# Patient Record
Sex: MALE | Race: WHITE | Employment: FULL TIME | URBAN - METROPOLITAN AREA
[De-identification: names, ages, dates, MRNs, and addresses within clinical notes are randomized per-mention and may not be internally consistent; named-entity substitution may affect disease eponyms.]

---

## 2017-01-17 ENCOUNTER — OFFICE VISIT (OUTPATIENT)
Dept: FAMILY MEDICINE CLINIC | Age: 42
End: 2017-01-17

## 2017-01-17 VITALS
HEART RATE: 100 BPM | TEMPERATURE: 98.2 F | SYSTOLIC BLOOD PRESSURE: 132 MMHG | RESPIRATION RATE: 20 BRPM | WEIGHT: 181.4 LBS | HEIGHT: 70 IN | BODY MASS INDEX: 25.97 KG/M2 | DIASTOLIC BLOOD PRESSURE: 82 MMHG

## 2017-01-17 DIAGNOSIS — S39.012A LUMBAR STRAIN, INITIAL ENCOUNTER: ICD-10-CM

## 2017-01-17 DIAGNOSIS — M62.838 MUSCLE SPASM: Primary | ICD-10-CM

## 2017-01-17 PROCEDURE — G8484 FLU IMMUNIZE NO ADMIN: HCPCS | Performed by: FAMILY MEDICINE

## 2017-01-17 PROCEDURE — 1036F TOBACCO NON-USER: CPT | Performed by: FAMILY MEDICINE

## 2017-01-17 PROCEDURE — G8419 CALC BMI OUT NRM PARAM NOF/U: HCPCS | Performed by: FAMILY MEDICINE

## 2017-01-17 PROCEDURE — G8427 DOCREV CUR MEDS BY ELIG CLIN: HCPCS | Performed by: FAMILY MEDICINE

## 2017-01-17 PROCEDURE — 99213 OFFICE O/P EST LOW 20 MIN: CPT | Performed by: FAMILY MEDICINE

## 2017-01-17 RX ORDER — CYCLOBENZAPRINE HCL 10 MG
10 TABLET ORAL 2 TIMES DAILY PRN
Qty: 30 TABLET | Refills: 0 | Status: SHIPPED | OUTPATIENT
Start: 2017-01-17 | End: 2017-01-27

## 2017-01-17 RX ORDER — METHYLPREDNISOLONE 4 MG/1
TABLET ORAL
Qty: 1 KIT | Refills: 0 | Status: SHIPPED | OUTPATIENT
Start: 2017-01-17 | End: 2017-02-24 | Stop reason: ALTCHOICE

## 2017-01-17 ASSESSMENT — ENCOUNTER SYMPTOMS
NAUSEA: 0
ABDOMINAL PAIN: 0
SHORTNESS OF BREATH: 0
EYES NEGATIVE: 1
COUGH: 0
CONSTIPATION: 0
DIARRHEA: 0

## 2017-01-17 ASSESSMENT — PATIENT HEALTH QUESTIONNAIRE - PHQ9
SUM OF ALL RESPONSES TO PHQ9 QUESTIONS 1 & 2: 0
SUM OF ALL RESPONSES TO PHQ QUESTIONS 1-9: 0
2. FEELING DOWN, DEPRESSED OR HOPELESS: 0
1. LITTLE INTEREST OR PLEASURE IN DOING THINGS: 0

## 2017-02-24 ENCOUNTER — OFFICE VISIT (OUTPATIENT)
Dept: FAMILY MEDICINE CLINIC | Age: 42
End: 2017-02-24

## 2017-02-24 VITALS
TEMPERATURE: 97.8 F | WEIGHT: 183 LBS | HEIGHT: 70 IN | OXYGEN SATURATION: 98 % | SYSTOLIC BLOOD PRESSURE: 130 MMHG | RESPIRATION RATE: 12 BRPM | HEART RATE: 104 BPM | DIASTOLIC BLOOD PRESSURE: 86 MMHG | BODY MASS INDEX: 26.2 KG/M2

## 2017-02-24 DIAGNOSIS — H00.13 CHALAZION OF RIGHT EYE: Primary | ICD-10-CM

## 2017-02-24 PROCEDURE — G8484 FLU IMMUNIZE NO ADMIN: HCPCS | Performed by: FAMILY MEDICINE

## 2017-02-24 PROCEDURE — G8427 DOCREV CUR MEDS BY ELIG CLIN: HCPCS | Performed by: FAMILY MEDICINE

## 2017-02-24 PROCEDURE — 1036F TOBACCO NON-USER: CPT | Performed by: FAMILY MEDICINE

## 2017-02-24 PROCEDURE — G8419 CALC BMI OUT NRM PARAM NOF/U: HCPCS | Performed by: FAMILY MEDICINE

## 2017-02-24 PROCEDURE — 99213 OFFICE O/P EST LOW 20 MIN: CPT | Performed by: FAMILY MEDICINE

## 2017-02-24 RX ORDER — CEPHALEXIN 500 MG/1
500 CAPSULE ORAL 3 TIMES DAILY
Qty: 30 CAPSULE | Refills: 0 | Status: SHIPPED | OUTPATIENT
Start: 2017-02-24 | End: 2017-03-06

## 2017-02-24 ASSESSMENT — ENCOUNTER SYMPTOMS
CONSTIPATION: 0
EYES NEGATIVE: 1
COUGH: 0
DIARRHEA: 0
ABDOMINAL PAIN: 0
NAUSEA: 0
SHORTNESS OF BREATH: 0

## 2017-04-04 ENCOUNTER — OFFICE VISIT (OUTPATIENT)
Dept: FAMILY MEDICINE CLINIC | Age: 42
End: 2017-04-04

## 2017-04-04 VITALS
DIASTOLIC BLOOD PRESSURE: 86 MMHG | HEIGHT: 70 IN | BODY MASS INDEX: 26.86 KG/M2 | WEIGHT: 187.6 LBS | TEMPERATURE: 97.6 F | RESPIRATION RATE: 12 BRPM | HEART RATE: 88 BPM | SYSTOLIC BLOOD PRESSURE: 144 MMHG

## 2017-04-04 DIAGNOSIS — R19.4 CHANGE IN BOWEL HABIT: ICD-10-CM

## 2017-04-04 DIAGNOSIS — R10.32 ABDOMINAL PAIN, LEFT LOWER QUADRANT: Primary | ICD-10-CM

## 2017-04-04 PROCEDURE — 99213 OFFICE O/P EST LOW 20 MIN: CPT | Performed by: FAMILY MEDICINE

## 2017-04-04 PROCEDURE — G8427 DOCREV CUR MEDS BY ELIG CLIN: HCPCS | Performed by: FAMILY MEDICINE

## 2017-04-04 PROCEDURE — G8419 CALC BMI OUT NRM PARAM NOF/U: HCPCS | Performed by: FAMILY MEDICINE

## 2017-04-04 PROCEDURE — 1036F TOBACCO NON-USER: CPT | Performed by: FAMILY MEDICINE

## 2017-04-04 ASSESSMENT — ENCOUNTER SYMPTOMS
ABDOMINAL PAIN: 1
DIARRHEA: 0
CONSTIPATION: 1
NAUSEA: 0
COUGH: 0
EYES NEGATIVE: 1
SHORTNESS OF BREATH: 0

## 2017-04-14 DIAGNOSIS — R19.4 CHANGE IN BOWEL HABIT: ICD-10-CM

## 2017-04-14 DIAGNOSIS — R10.32 ABDOMINAL PAIN, LEFT LOWER QUADRANT: ICD-10-CM

## 2017-04-24 ENCOUNTER — OFFICE VISIT (OUTPATIENT)
Dept: FAMILY MEDICINE CLINIC | Age: 42
End: 2017-04-24

## 2017-04-24 VITALS
SYSTOLIC BLOOD PRESSURE: 126 MMHG | HEART RATE: 84 BPM | DIASTOLIC BLOOD PRESSURE: 84 MMHG | WEIGHT: 189 LBS | HEIGHT: 70 IN | BODY MASS INDEX: 27.06 KG/M2 | TEMPERATURE: 97.7 F | RESPIRATION RATE: 16 BRPM

## 2017-04-24 DIAGNOSIS — R10.32 ABDOMINAL PAIN, LEFT LOWER QUADRANT: Primary | ICD-10-CM

## 2017-04-24 PROCEDURE — G8427 DOCREV CUR MEDS BY ELIG CLIN: HCPCS | Performed by: FAMILY MEDICINE

## 2017-04-24 PROCEDURE — G8419 CALC BMI OUT NRM PARAM NOF/U: HCPCS | Performed by: FAMILY MEDICINE

## 2017-04-24 PROCEDURE — 1036F TOBACCO NON-USER: CPT | Performed by: FAMILY MEDICINE

## 2017-04-24 PROCEDURE — 99213 OFFICE O/P EST LOW 20 MIN: CPT | Performed by: FAMILY MEDICINE

## 2017-04-24 ASSESSMENT — ENCOUNTER SYMPTOMS
DIARRHEA: 0
EYES NEGATIVE: 1
COUGH: 0
NAUSEA: 0
SHORTNESS OF BREATH: 0
ABDOMINAL PAIN: 1
CONSTIPATION: 0

## 2017-05-26 ENCOUNTER — TELEPHONE (OUTPATIENT)
Dept: FAMILY MEDICINE CLINIC | Age: 42
End: 2017-05-26

## 2017-05-26 DIAGNOSIS — R76.8 HEPATITIS C ANTIBODY POSITIVE IN BLOOD: Primary | ICD-10-CM

## 2017-05-30 RX ORDER — CIPROFLOXACIN 500 MG/1
500 TABLET, FILM COATED ORAL 2 TIMES DAILY
Qty: 14 TABLET | Refills: 0 | Status: SHIPPED | OUTPATIENT
Start: 2017-05-30 | End: 2017-06-22 | Stop reason: SDUPTHER

## 2017-06-05 DIAGNOSIS — R76.8 HEPATITIS C ANTIBODY TEST POSITIVE: Primary | ICD-10-CM

## 2017-06-06 DIAGNOSIS — R76.8 HEPATITIS C ANTIBODY TEST POSITIVE: ICD-10-CM

## 2017-06-08 LAB
EER HEPATITIS C RNA PCR QUANT W/ GENOTYPE RFLX: NORMAL
HCV RNA QNT REAL-TIME PCR INTERP: NOT DETECTED
HCV RNA, QUANTITATIVE REAL TIME PCR: <1.2 LOG IU
HEPATITIS C RNA PCR QUANT: <15 IU/ML

## 2017-06-22 ENCOUNTER — OFFICE VISIT (OUTPATIENT)
Dept: FAMILY MEDICINE CLINIC | Age: 42
End: 2017-06-22

## 2017-06-22 VITALS
HEIGHT: 70 IN | DIASTOLIC BLOOD PRESSURE: 84 MMHG | TEMPERATURE: 97.9 F | RESPIRATION RATE: 16 BRPM | HEART RATE: 80 BPM | WEIGHT: 192.2 LBS | BODY MASS INDEX: 27.52 KG/M2 | SYSTOLIC BLOOD PRESSURE: 130 MMHG

## 2017-06-22 DIAGNOSIS — R30.0 DYSURIA: Primary | ICD-10-CM

## 2017-06-22 DIAGNOSIS — N41.0 ACUTE PROSTATITIS: ICD-10-CM

## 2017-06-22 DIAGNOSIS — R35.0 URINARY FREQUENCY: ICD-10-CM

## 2017-06-22 DIAGNOSIS — R30.0 DYSURIA: ICD-10-CM

## 2017-06-22 DIAGNOSIS — Z12.5 SPECIAL SCREENING FOR MALIGNANT NEOPLASM OF PROSTATE: ICD-10-CM

## 2017-06-22 LAB
BILIRUBIN, POC: NORMAL
BLOOD URINE, POC: NORMAL
CLARITY, POC: CLEAR
COLOR, POC: NORMAL
GLUCOSE URINE, POC: NORMAL
KETONES, POC: NORMAL
LEUKOCYTE EST, POC: NORMAL
NITRITE, POC: NORMAL
PH, POC: 6
PROSTATE SPECIFIC ANTIGEN: 0.95 NG/ML
PROTEIN, POC: NORMAL
SPECIFIC GRAVITY, POC: 1.01
UROBILINOGEN, POC: NORMAL

## 2017-06-22 PROCEDURE — 99213 OFFICE O/P EST LOW 20 MIN: CPT | Performed by: FAMILY MEDICINE

## 2017-06-22 PROCEDURE — G8419 CALC BMI OUT NRM PARAM NOF/U: HCPCS | Performed by: FAMILY MEDICINE

## 2017-06-22 PROCEDURE — 81003 URINALYSIS AUTO W/O SCOPE: CPT | Performed by: FAMILY MEDICINE

## 2017-06-22 PROCEDURE — 1036F TOBACCO NON-USER: CPT | Performed by: FAMILY MEDICINE

## 2017-06-22 PROCEDURE — G8427 DOCREV CUR MEDS BY ELIG CLIN: HCPCS | Performed by: FAMILY MEDICINE

## 2017-06-22 RX ORDER — CIPROFLOXACIN 500 MG/1
500 TABLET, FILM COATED ORAL 2 TIMES DAILY
Qty: 42 TABLET | Refills: 0 | Status: SHIPPED | OUTPATIENT
Start: 2017-06-22 | End: 2017-07-13

## 2017-06-24 LAB — URINE CULTURE, ROUTINE: NORMAL

## 2017-06-25 ASSESSMENT — ENCOUNTER SYMPTOMS
SHORTNESS OF BREATH: 0
CONSTIPATION: 0
BLOOD IN STOOL: 0

## 2017-07-27 LAB
AMYLASE: 43 U/L (ref 28–100)
LIPASE: 37 U/L (ref 13–60)

## 2017-09-18 ENCOUNTER — ANESTHESIA EVENT (OUTPATIENT)
Dept: ENDOSCOPY | Age: 42
End: 2017-09-18
Payer: COMMERCIAL

## 2017-09-18 ENCOUNTER — HOSPITAL ENCOUNTER (OUTPATIENT)
Age: 42
Setting detail: OUTPATIENT SURGERY
Discharge: HOME | End: 2017-09-18
Attending: SPECIALIST | Admitting: SPECIALIST
Payer: COMMERCIAL

## 2017-09-18 ENCOUNTER — ANESTHESIA (OUTPATIENT)
Dept: ENDOSCOPY | Age: 42
End: 2017-09-18
Payer: COMMERCIAL

## 2017-09-18 VITALS
RESPIRATION RATE: 21 BRPM | OXYGEN SATURATION: 97 % | DIASTOLIC BLOOD PRESSURE: 55 MMHG | SYSTOLIC BLOOD PRESSURE: 93 MMHG

## 2017-09-18 VITALS
BODY MASS INDEX: 27.06 KG/M2 | SYSTOLIC BLOOD PRESSURE: 98 MMHG | OXYGEN SATURATION: 97 % | HEIGHT: 70 IN | TEMPERATURE: 97.8 F | DIASTOLIC BLOOD PRESSURE: 50 MMHG | RESPIRATION RATE: 20 BRPM | HEART RATE: 77 BPM | WEIGHT: 189 LBS

## 2017-09-18 PROCEDURE — 3609027000 HC COLONOSCOPY: Performed by: SPECIALIST

## 2017-09-18 PROCEDURE — 7100000010 HC PHASE II RECOVERY - FIRST 15 MIN: Performed by: SPECIALIST

## 2017-09-18 PROCEDURE — 2580000003 HC RX 258: Performed by: SPECIALIST

## 2017-09-18 PROCEDURE — 6360000002 HC RX W HCPCS: Performed by: NURSE ANESTHETIST, CERTIFIED REGISTERED

## 2017-09-18 PROCEDURE — 88305 TISSUE EXAM BY PATHOLOGIST: CPT

## 2017-09-18 PROCEDURE — 2500000003 HC RX 250 WO HCPCS: Performed by: NURSE ANESTHETIST, CERTIFIED REGISTERED

## 2017-09-18 PROCEDURE — 3700000001 HC ADD 15 MINUTES (ANESTHESIA): Performed by: SPECIALIST

## 2017-09-18 PROCEDURE — 7100000011 HC PHASE II RECOVERY - ADDTL 15 MIN: Performed by: SPECIALIST

## 2017-09-18 PROCEDURE — 3700000000 HC ANESTHESIA ATTENDED CARE: Performed by: SPECIALIST

## 2017-09-18 RX ORDER — ONDANSETRON 2 MG/ML
4 INJECTION INTRAMUSCULAR; INTRAVENOUS
Status: DISCONTINUED | OUTPATIENT
Start: 2017-09-18 | End: 2017-09-18 | Stop reason: HOSPADM

## 2017-09-18 RX ORDER — SODIUM CHLORIDE 0.9 % (FLUSH) 0.9 %
10 SYRINGE (ML) INJECTION EVERY 12 HOURS SCHEDULED
Status: DISCONTINUED | OUTPATIENT
Start: 2017-09-18 | End: 2017-09-18 | Stop reason: HOSPADM

## 2017-09-18 RX ORDER — PROPOFOL 10 MG/ML
INJECTION, EMULSION INTRAVENOUS PRN
Status: DISCONTINUED | OUTPATIENT
Start: 2017-09-18 | End: 2017-09-18 | Stop reason: SDUPTHER

## 2017-09-18 RX ORDER — LIDOCAINE HYDROCHLORIDE 10 MG/ML
1 INJECTION, SOLUTION EPIDURAL; INFILTRATION; INTRACAUDAL; PERINEURAL
Status: DISCONTINUED | OUTPATIENT
Start: 2017-09-18 | End: 2017-09-18 | Stop reason: HOSPADM

## 2017-09-18 RX ORDER — PROPOFOL 10 MG/ML
INJECTION, EMULSION INTRAVENOUS CONTINUOUS PRN
Status: DISCONTINUED | OUTPATIENT
Start: 2017-09-18 | End: 2017-09-18 | Stop reason: SDUPTHER

## 2017-09-18 RX ORDER — LIDOCAINE HYDROCHLORIDE 10 MG/ML
INJECTION, SOLUTION INFILTRATION; PERINEURAL PRN
Status: DISCONTINUED | OUTPATIENT
Start: 2017-09-18 | End: 2017-09-18 | Stop reason: SDUPTHER

## 2017-09-18 RX ORDER — SODIUM CHLORIDE 9 MG/ML
INJECTION, SOLUTION INTRAVENOUS CONTINUOUS
Status: DISCONTINUED | OUTPATIENT
Start: 2017-09-18 | End: 2017-09-18 | Stop reason: HOSPADM

## 2017-09-18 RX ORDER — SODIUM CHLORIDE 0.9 % (FLUSH) 0.9 %
10 SYRINGE (ML) INJECTION PRN
Status: DISCONTINUED | OUTPATIENT
Start: 2017-09-18 | End: 2017-09-18 | Stop reason: HOSPADM

## 2017-09-18 RX ADMIN — PROPOFOL 100 MCG/KG/MIN: 10 INJECTION, EMULSION INTRAVENOUS at 08:42

## 2017-09-18 RX ADMIN — LIDOCAINE HYDROCHLORIDE 50 MG: 10 INJECTION, SOLUTION INFILTRATION; PERINEURAL at 08:42

## 2017-09-18 RX ADMIN — SODIUM CHLORIDE: 9 INJECTION, SOLUTION INTRAVENOUS at 08:33

## 2017-09-18 RX ADMIN — PROPOFOL 40 MG: 10 INJECTION, EMULSION INTRAVENOUS at 08:42

## 2017-09-18 RX ADMIN — PROPOFOL 50 MG: 10 INJECTION, EMULSION INTRAVENOUS at 08:43

## 2017-09-18 ASSESSMENT — PAIN - FUNCTIONAL ASSESSMENT: PAIN_FUNCTIONAL_ASSESSMENT: 0-10

## 2017-11-08 ENCOUNTER — TELEPHONE (OUTPATIENT)
Dept: FAMILY MEDICINE CLINIC | Age: 42
End: 2017-11-08

## 2017-11-09 DIAGNOSIS — K45.8 HERNIA, INTERNAL: Primary | ICD-10-CM

## 2017-11-10 ENCOUNTER — TELEPHONE (OUTPATIENT)
Dept: SURGERY | Age: 42
End: 2017-11-10

## 2017-11-10 NOTE — TELEPHONE ENCOUNTER
Called patient and left a voicemail to call and schedule an appointment With Dr. Braun per Dr. Marifer Campbell referral [facility]

## 2017-11-14 ENCOUNTER — INITIAL CONSULT (OUTPATIENT)
Dept: SURGERY | Age: 42
End: 2017-11-14

## 2017-11-14 VITALS
HEIGHT: 71 IN | DIASTOLIC BLOOD PRESSURE: 90 MMHG | WEIGHT: 199 LBS | RESPIRATION RATE: 12 BRPM | TEMPERATURE: 98.3 F | SYSTOLIC BLOOD PRESSURE: 140 MMHG | BODY MASS INDEX: 27.86 KG/M2 | HEART RATE: 80 BPM

## 2017-11-14 DIAGNOSIS — K40.20 BILATERAL INGUINAL HERNIA WITHOUT OBSTRUCTION OR GANGRENE, RECURRENCE NOT SPECIFIED: ICD-10-CM

## 2017-11-14 DIAGNOSIS — Z01.818 PRE-OP TESTING: Primary | ICD-10-CM

## 2017-11-14 DIAGNOSIS — K43.9 VENTRAL HERNIA WITHOUT OBSTRUCTION OR GANGRENE: ICD-10-CM

## 2017-11-14 PROCEDURE — G8427 DOCREV CUR MEDS BY ELIG CLIN: HCPCS | Performed by: SURGERY

## 2017-11-14 PROCEDURE — G8419 CALC BMI OUT NRM PARAM NOF/U: HCPCS | Performed by: SURGERY

## 2017-11-14 PROCEDURE — 1036F TOBACCO NON-USER: CPT | Performed by: SURGERY

## 2017-11-14 PROCEDURE — 99204 OFFICE O/P NEW MOD 45 MIN: CPT | Performed by: SURGERY

## 2017-11-14 PROCEDURE — G8484 FLU IMMUNIZE NO ADMIN: HCPCS | Performed by: SURGERY

## 2017-11-14 ASSESSMENT — ENCOUNTER SYMPTOMS
WHEEZING: 0
EYE DISCHARGE: 0
EYE PAIN: 0
VOMITING: 0
SHORTNESS OF BREATH: 0
CHOKING: 0
ABDOMINAL PAIN: 0
ABDOMINAL DISTENTION: 0
ANAL BLEEDING: 0
COLOR CHANGE: 0
CHEST TIGHTNESS: 0
TROUBLE SWALLOWING: 0
BACK PAIN: 0

## 2017-11-14 NOTE — PROGRESS NOTES
Gm Woodson      I have reviewed the Medical Assistant's entries in the Past Medical History, Medications, Allergies, Social History and Vital Sign sections      Chief Complaint   Patient presents with    Surgical Consult         HPI      42 M seen from DR Sarahi Castellon for hernia    Pt with tender lump at umbilicus    There for several months  Used to be painless, now hurts    also gets LLQ pain                                                  No past medical history on file. Past Surgical History:   Procedure Laterality Date    FL COLON CA SCRN NOT  [address] IND N/A 9/18/2017    COLONOSCOPY performed by Layton Ludwig MD at [place] [address]           Current problems include  There is no problem list on file for this patient. Social History     Social History    Marital status: Single     Spouse name: N/A    Number of children: 0    Years of education: N/A     Occupational History          [employer] Social History Main Topics    Smoking status: Former Smoker     Packs/day: 0.50     Years: 8.00     Types: Cigarettes     Start date: 1/1/1993     Quit date: 1/1/2001    Smokeless tobacco: Never Used    Alcohol use Yes      Comment: Drinks beer occassionally.  Drug use: No    Sexual activity: Not on file     Other Topics Concern    Not on file     Social History Narrative    No narrative on file                 Family History   Problem Relation Age of Onset    Heart Disease Father     Heart Disease Paternal Grandfather     No Known Problems Sister     No Known Problems Sister          No current outpatient prescriptions on file. No current facility-administered medications for this visit. No Known Allergies      Review of Systems   Constitutional: Negative for chills, fatigue, fever and unexpected weight change. HENT: Negative for nosebleeds and trouble swallowing. Eyes: Negative for pain and discharge.    Respiratory: likely aggravated by heavy recreational weight lifting. [facility] likely causing LLQ pain    Pt agrees to repair the ventral hernia. He will consider repair of the groin hernias. The patient has a ventral hernia. The standard treatment is surgical repair. Recommend surgical repair at patient's convenience. Plan an open repair. The risks, benefits and indications for repair of ventral hernia are reviewed with the patient. The potential of bleeding, infection, MI, PE/DVT, death, etc are reviewed. The use of mesh or prosthetic material is reviewed. Anticipated convalescence is discussed. All questions are answered and consent is obtained.         Huy Meyer MD

## 2017-11-27 ENCOUNTER — TELEPHONE (OUTPATIENT)
Dept: FAMILY MEDICINE CLINIC | Age: 42
End: 2017-11-27

## 2017-11-27 ENCOUNTER — TELEPHONE (OUTPATIENT)
Dept: SURGERY | Age: 42
End: 2017-11-27

## 2017-11-27 NOTE — TELEPHONE ENCOUNTER
Because he has not been seen by us since June he will need an appointment for presurgery clearance. These are not my rules.   Please check with Dr. Raghav Morse office to make sure but I'm positive he needs clearance before he will do surgery

## 2017-11-27 NOTE — TELEPHONE ENCOUNTER
Pt is calling because you had referred him to Dr Jamee Kayser, which he has surgery scheduled with him on 12/8/17. States he has paperwork from Dr Jamee Kayser that states, \"does NOT need cardiac clearance, just needs an EKG\". Pt does not think he needs to come in for PAT, just an EKG. Wanted to know if he could just bring his orders for the EKG and just get that done? States he has already had a physical done, blood work, CT scan.   Please advise  Thanks

## 2017-12-04 ENCOUNTER — OFFICE VISIT (OUTPATIENT)
Dept: FAMILY MEDICINE CLINIC | Age: 42
End: 2017-12-04

## 2017-12-04 VITALS
HEART RATE: 80 BPM | WEIGHT: 199.4 LBS | SYSTOLIC BLOOD PRESSURE: 150 MMHG | TEMPERATURE: 96.8 F | RESPIRATION RATE: 16 BRPM | BODY MASS INDEX: 27.92 KG/M2 | HEIGHT: 71 IN | DIASTOLIC BLOOD PRESSURE: 82 MMHG

## 2017-12-04 DIAGNOSIS — Z01.818 PRE-OP EXAMINATION: ICD-10-CM

## 2017-12-04 DIAGNOSIS — K42.9 UMBILICAL HERNIA WITHOUT OBSTRUCTION AND WITHOUT GANGRENE: ICD-10-CM

## 2017-12-04 DIAGNOSIS — Z01.818 PRE-OP EXAMINATION: Primary | ICD-10-CM

## 2017-12-04 DIAGNOSIS — K40.20 BILATERAL INGUINAL HERNIA WITHOUT OBSTRUCTION OR GANGRENE, RECURRENCE NOT SPECIFIED: ICD-10-CM

## 2017-12-04 LAB
ANION GAP SERPL CALCULATED.3IONS-SCNC: 14 MEQ/L (ref 7–13)
APTT: 26.1 SEC (ref 21.6–35.4)
BASOPHILS ABSOLUTE: 0 K/UL (ref 0–0.2)
BASOPHILS RELATIVE PERCENT: 0.7 %
BUN BLDV-MCNC: 18 MG/DL (ref 6–20)
CALCIUM SERPL-MCNC: 9.1 MG/DL (ref 8.6–10.2)
CHLORIDE BLD-SCNC: 102 MEQ/L (ref 98–107)
CO2: 26 MEQ/L (ref 22–29)
CREAT SERPL-MCNC: 0.96 MG/DL (ref 0.7–1.2)
EOSINOPHILS ABSOLUTE: 0.1 K/UL (ref 0–0.7)
EOSINOPHILS RELATIVE PERCENT: 1.6 %
GFR AFRICAN AMERICAN: >60
GFR NON-AFRICAN AMERICAN: >60
GLUCOSE BLD-MCNC: 88 MG/DL (ref 74–109)
HCT VFR BLD CALC: 46.1 % (ref 42–52)
HEMOGLOBIN: 15.8 G/DL (ref 14–18)
INR BLD: 1.1
LYMPHOCYTES ABSOLUTE: 2.3 K/UL (ref 1–4.8)
LYMPHOCYTES RELATIVE PERCENT: 38.2 %
MCH RBC QN AUTO: 31.8 PG (ref 27–31.3)
MCHC RBC AUTO-ENTMCNC: 34.3 % (ref 33–37)
MCV RBC AUTO: 92.6 FL (ref 80–100)
MONOCYTES ABSOLUTE: 0.7 K/UL (ref 0.2–0.8)
MONOCYTES RELATIVE PERCENT: 11.1 %
NEUTROPHILS ABSOLUTE: 2.9 K/UL (ref 1.4–6.5)
NEUTROPHILS RELATIVE PERCENT: 48.4 %
PDW BLD-RTO: 13 % (ref 11.5–14.5)
PLATELET # BLD: 220 K/UL (ref 130–400)
POTASSIUM SERPL-SCNC: 4.5 MEQ/L (ref 3.5–5.1)
PROTHROMBIN TIME: 11.2 SEC (ref 8.1–13.7)
RBC # BLD: 4.97 M/UL (ref 4.7–6.1)
SODIUM BLD-SCNC: 142 MEQ/L (ref 132–144)
WBC # BLD: 6 K/UL (ref 4.8–10.8)

## 2017-12-04 PROCEDURE — 93000 ELECTROCARDIOGRAM COMPLETE: CPT | Performed by: FAMILY MEDICINE

## 2017-12-04 PROCEDURE — 99214 OFFICE O/P EST MOD 30 MIN: CPT | Performed by: FAMILY MEDICINE

## 2017-12-04 PROCEDURE — 1036F TOBACCO NON-USER: CPT | Performed by: FAMILY MEDICINE

## 2017-12-04 PROCEDURE — G8419 CALC BMI OUT NRM PARAM NOF/U: HCPCS | Performed by: FAMILY MEDICINE

## 2017-12-04 PROCEDURE — G8484 FLU IMMUNIZE NO ADMIN: HCPCS | Performed by: FAMILY MEDICINE

## 2017-12-04 PROCEDURE — G8427 DOCREV CUR MEDS BY ELIG CLIN: HCPCS | Performed by: FAMILY MEDICINE

## 2017-12-04 ASSESSMENT — ENCOUNTER SYMPTOMS
NAUSEA: 0
EYES NEGATIVE: 1
DIARRHEA: 0
CONSTIPATION: 0
SHORTNESS OF BREATH: 0
ABDOMINAL PAIN: 0
COUGH: 0

## 2017-12-04 NOTE — PROGRESS NOTES
Subjective  Xochilt Quintero, 36 y.o. male presents today with:  Chief Complaint   Patient presents with    Pre-op Exam     Patient presents today for Pre-Op. Is having a Hernia Repair on 12-8-17 with Dr. Destiny Pelaez. Will need an EKG. HPI    Patient presents today for a Preop evaluation for hernia repair surgery scheduled for December 8    Problem list discussed. Eating okay. Stays active. Overall doing well. Has no new problem / question. Health Maintenance Is Up-To-Date          No other questions and or concerns for today's visit      Review of Systems   Constitutional: Negative for activity change, appetite change, fatigue and fever. HENT: Negative for ear pain. Eyes: Negative. Respiratory: Negative for cough and shortness of breath. Cardiovascular: Negative for chest pain and palpitations. Gastrointestinal: Negative for abdominal pain, constipation, diarrhea and nausea. Genitourinary: Negative for dysuria and frequency. Neurological: Negative for dizziness and headaches. History reviewed. No pertinent past medical history. Past Surgical History:   Procedure Laterality Date    DE COLON CA SCRN NOT  [address] IND N/A 9/18/2017    COLONOSCOPY performed by Selwyn Eisenmenger, MD at [place] [address]       Social History     Social History    Marital status: Single     Spouse name: N/A    Number of children: 0    Years of education: N/A     Occupational History          [employer] Social History Main Topics    Smoking status: Former Smoker     Packs/day: 0.50     Years: 8.00     Types: Cigarettes     Start date: 1/1/1993     Quit date: 1/1/2001    Smokeless tobacco: Never Used    Alcohol use Yes      Comment: Drinks beer occassionally.      Drug use: No    Sexual activity: Not on file     Other Topics Concern    Not on file     Social History Narrative    No narrative on file     Family History   Problem

## 2017-12-07 ENCOUNTER — PREP FOR PROCEDURE (OUTPATIENT)
Dept: SURGERY | Age: 42
End: 2017-12-07

## 2017-12-07 RX ORDER — HEPARIN SODIUM 5000 [USP'U]/.5ML
5000 INJECTION, SOLUTION INTRAVENOUS; SUBCUTANEOUS
Status: CANCELLED | OUTPATIENT
Start: 2017-12-07 | End: 2017-12-07

## 2017-12-08 ENCOUNTER — ANESTHESIA EVENT (OUTPATIENT)
Dept: OPERATING ROOM | Age: 42
End: 2017-12-08
Payer: COMMERCIAL

## 2017-12-08 ENCOUNTER — ANESTHESIA (OUTPATIENT)
Dept: OPERATING ROOM | Age: 42
End: 2017-12-08
Payer: COMMERCIAL

## 2017-12-08 ENCOUNTER — HOSPITAL ENCOUNTER (OUTPATIENT)
Age: 42
Setting detail: OUTPATIENT SURGERY
Discharge: HOME | End: 2017-12-08
Attending: SURGERY | Admitting: SURGERY
Payer: COMMERCIAL

## 2017-12-08 VITALS
HEART RATE: 97 BPM | RESPIRATION RATE: 20 BRPM | WEIGHT: 190 LBS | HEIGHT: 71 IN | OXYGEN SATURATION: 98 % | DIASTOLIC BLOOD PRESSURE: 80 MMHG | SYSTOLIC BLOOD PRESSURE: 128 MMHG | TEMPERATURE: 98.2 F | BODY MASS INDEX: 26.6 KG/M2

## 2017-12-08 VITALS
TEMPERATURE: 97.5 F | RESPIRATION RATE: 3 BRPM | OXYGEN SATURATION: 97 % | DIASTOLIC BLOOD PRESSURE: 71 MMHG | SYSTOLIC BLOOD PRESSURE: 119 MMHG

## 2017-12-08 PROCEDURE — 2580000003 HC RX 258: Performed by: STUDENT IN AN ORGANIZED HEALTH CARE EDUCATION/TRAINING PROGRAM

## 2017-12-08 PROCEDURE — 2500000003 HC RX 250 WO HCPCS: Performed by: STUDENT IN AN ORGANIZED HEALTH CARE EDUCATION/TRAINING PROGRAM

## 2017-12-08 PROCEDURE — 7100000011 HC PHASE II RECOVERY - ADDTL 15 MIN: Performed by: SURGERY

## 2017-12-08 PROCEDURE — 6360000002 HC RX W HCPCS: Performed by: NURSE ANESTHETIST, CERTIFIED REGISTERED

## 2017-12-08 PROCEDURE — 6360000002 HC RX W HCPCS: Performed by: ANESTHESIOLOGY

## 2017-12-08 PROCEDURE — 2500000003 HC RX 250 WO HCPCS: Performed by: NURSE ANESTHETIST, CERTIFIED REGISTERED

## 2017-12-08 PROCEDURE — 6360000002 HC RX W HCPCS: Performed by: SURGERY

## 2017-12-08 PROCEDURE — A6402 STERILE GAUZE <= 16 SQ IN: HCPCS | Performed by: SURGERY

## 2017-12-08 PROCEDURE — 49505 PRP I/HERN INIT REDUC >5 YR: CPT | Performed by: SURGERY

## 2017-12-08 PROCEDURE — 7100000001 HC PACU RECOVERY - ADDTL 15 MIN: Performed by: SURGERY

## 2017-12-08 PROCEDURE — 3700000000 HC ANESTHESIA ATTENDED CARE: Performed by: SURGERY

## 2017-12-08 PROCEDURE — 6370000000 HC RX 637 (ALT 250 FOR IP): Performed by: SURGERY

## 2017-12-08 PROCEDURE — C1781 MESH (IMPLANTABLE): HCPCS | Performed by: SURGERY

## 2017-12-08 PROCEDURE — 2580000003 HC RX 258: Performed by: SURGERY

## 2017-12-08 PROCEDURE — 49568 PR IMPLANT MESH HERNIA REPAIR/DEBRIDEMENT CLOSURE: CPT | Performed by: SURGERY

## 2017-12-08 PROCEDURE — 3600000012 HC SURGERY LEVEL 2 ADDTL 15MIN: Performed by: SURGERY

## 2017-12-08 PROCEDURE — 7100000000 HC PACU RECOVERY - FIRST 15 MIN: Performed by: SURGERY

## 2017-12-08 PROCEDURE — 3600000002 HC SURGERY LEVEL 2 BASE: Performed by: SURGERY

## 2017-12-08 PROCEDURE — 7100000010 HC PHASE II RECOVERY - FIRST 15 MIN: Performed by: SURGERY

## 2017-12-08 PROCEDURE — 49560 PR REPAIR INCISIONAL HERNIA,REDUCIBLE: CPT | Performed by: SURGERY

## 2017-12-08 PROCEDURE — 3700000001 HC ADD 15 MINUTES (ANESTHESIA): Performed by: SURGERY

## 2017-12-08 DEVICE — MESH HERN M DIA6.4CM VENTRAL POLYPR EPTFE CIR SELF EXP PTCH: Type: IMPLANTABLE DEVICE | Site: UMBILICAL | Status: FUNCTIONAL

## 2017-12-08 DEVICE — MESH HERN W3XL6IN INGUINAL POLYPR MFIL RECTANG: Type: IMPLANTABLE DEVICE | Site: GROIN | Status: FUNCTIONAL

## 2017-12-08 RX ORDER — MIDAZOLAM HYDROCHLORIDE 1 MG/ML
INJECTION INTRAMUSCULAR; INTRAVENOUS PRN
Status: DISCONTINUED | OUTPATIENT
Start: 2017-12-08 | End: 2017-12-08 | Stop reason: SDUPTHER

## 2017-12-08 RX ORDER — HEPARIN SODIUM 5000 [USP'U]/.5ML
5000 INJECTION, SOLUTION INTRAVENOUS; SUBCUTANEOUS
Status: COMPLETED | OUTPATIENT
Start: 2017-12-08 | End: 2017-12-08

## 2017-12-08 RX ORDER — ONDANSETRON 2 MG/ML
4 INJECTION INTRAMUSCULAR; INTRAVENOUS EVERY 6 HOURS PRN
Status: DISCONTINUED | OUTPATIENT
Start: 2017-12-08 | End: 2017-12-08 | Stop reason: HOSPADM

## 2017-12-08 RX ORDER — ROCURONIUM BROMIDE 10 MG/ML
INJECTION, SOLUTION INTRAVENOUS PRN
Status: DISCONTINUED | OUTPATIENT
Start: 2017-12-08 | End: 2017-12-08 | Stop reason: SDUPTHER

## 2017-12-08 RX ORDER — SODIUM CHLORIDE 0.9 % (FLUSH) 0.9 %
10 SYRINGE (ML) INJECTION PRN
Status: DISCONTINUED | OUTPATIENT
Start: 2017-12-08 | End: 2017-12-08 | Stop reason: HOSPADM

## 2017-12-08 RX ORDER — DEXAMETHASONE SODIUM PHOSPHATE 4 MG/ML
INJECTION, SOLUTION INTRA-ARTICULAR; INTRALESIONAL; INTRAMUSCULAR; INTRAVENOUS; SOFT TISSUE PRN
Status: DISCONTINUED | OUTPATIENT
Start: 2017-12-08 | End: 2017-12-08 | Stop reason: SDUPTHER

## 2017-12-08 RX ORDER — HYDROCODONE BITARTRATE AND ACETAMINOPHEN 5; 325 MG/1; MG/1
2 TABLET ORAL PRN
Status: DISCONTINUED | OUTPATIENT
Start: 2017-12-08 | End: 2017-12-08 | Stop reason: HOSPADM

## 2017-12-08 RX ORDER — DIPHENHYDRAMINE HYDROCHLORIDE 50 MG/ML
12.5 INJECTION INTRAMUSCULAR; INTRAVENOUS
Status: DISCONTINUED | OUTPATIENT
Start: 2017-12-08 | End: 2017-12-08 | Stop reason: HOSPADM

## 2017-12-08 RX ORDER — METOCLOPRAMIDE HYDROCHLORIDE 5 MG/ML
10 INJECTION INTRAMUSCULAR; INTRAVENOUS
Status: DISCONTINUED | OUTPATIENT
Start: 2017-12-08 | End: 2017-12-08 | Stop reason: HOSPADM

## 2017-12-08 RX ORDER — KETOROLAC TROMETHAMINE 30 MG/ML
30 INJECTION, SOLUTION INTRAMUSCULAR; INTRAVENOUS EVERY 6 HOURS
Status: DISCONTINUED | OUTPATIENT
Start: 2017-12-08 | End: 2017-12-08 | Stop reason: HOSPADM

## 2017-12-08 RX ORDER — LIDOCAINE HYDROCHLORIDE 10 MG/ML
1 INJECTION, SOLUTION EPIDURAL; INFILTRATION; INTRACAUDAL; PERINEURAL
Status: COMPLETED | OUTPATIENT
Start: 2017-12-08 | End: 2017-12-08

## 2017-12-08 RX ORDER — MORPHINE SULFATE 4 MG/ML
4 INJECTION, SOLUTION INTRAMUSCULAR; INTRAVENOUS
Status: DISCONTINUED | OUTPATIENT
Start: 2017-12-08 | End: 2017-12-08 | Stop reason: HOSPADM

## 2017-12-08 RX ORDER — ACETAMINOPHEN AND CODEINE PHOSPHATE 300; 30 MG/1; MG/1
1 TABLET ORAL EVERY 4 HOURS PRN
Status: DISCONTINUED | OUTPATIENT
Start: 2017-12-08 | End: 2017-12-08 | Stop reason: HOSPADM

## 2017-12-08 RX ORDER — MAGNESIUM HYDROXIDE 1200 MG/15ML
LIQUID ORAL CONTINUOUS PRN
Status: DISCONTINUED | OUTPATIENT
Start: 2017-12-08 | End: 2017-12-08 | Stop reason: HOSPADM

## 2017-12-08 RX ORDER — MEPERIDINE HYDROCHLORIDE 25 MG/ML
12.5 INJECTION INTRAMUSCULAR; INTRAVENOUS; SUBCUTANEOUS EVERY 5 MIN PRN
Status: DISCONTINUED | OUTPATIENT
Start: 2017-12-08 | End: 2017-12-08 | Stop reason: HOSPADM

## 2017-12-08 RX ORDER — ONDANSETRON 2 MG/ML
INJECTION INTRAMUSCULAR; INTRAVENOUS PRN
Status: DISCONTINUED | OUTPATIENT
Start: 2017-12-08 | End: 2017-12-08 | Stop reason: SDUPTHER

## 2017-12-08 RX ORDER — FENTANYL CITRATE 50 UG/ML
50 INJECTION, SOLUTION INTRAMUSCULAR; INTRAVENOUS EVERY 10 MIN PRN
Status: DISCONTINUED | OUTPATIENT
Start: 2017-12-08 | End: 2017-12-08 | Stop reason: HOSPADM

## 2017-12-08 RX ORDER — SODIUM CHLORIDE, SODIUM LACTATE, POTASSIUM CHLORIDE, CALCIUM CHLORIDE 600; 310; 30; 20 MG/100ML; MG/100ML; MG/100ML; MG/100ML
INJECTION, SOLUTION INTRAVENOUS CONTINUOUS
Status: DISCONTINUED | OUTPATIENT
Start: 2017-12-08 | End: 2017-12-08 | Stop reason: HOSPADM

## 2017-12-08 RX ORDER — PROPOFOL 10 MG/ML
INJECTION, EMULSION INTRAVENOUS PRN
Status: DISCONTINUED | OUTPATIENT
Start: 2017-12-08 | End: 2017-12-08 | Stop reason: SDUPTHER

## 2017-12-08 RX ORDER — HYDROCODONE BITARTRATE AND ACETAMINOPHEN 5; 325 MG/1; MG/1
1 TABLET ORAL PRN
Status: DISCONTINUED | OUTPATIENT
Start: 2017-12-08 | End: 2017-12-08 | Stop reason: HOSPADM

## 2017-12-08 RX ORDER — LIDOCAINE HYDROCHLORIDE 20 MG/ML
INJECTION, SOLUTION INFILTRATION; PERINEURAL PRN
Status: DISCONTINUED | OUTPATIENT
Start: 2017-12-08 | End: 2017-12-08 | Stop reason: SDUPTHER

## 2017-12-08 RX ORDER — MORPHINE SULFATE 2 MG/ML
2 INJECTION, SOLUTION INTRAMUSCULAR; INTRAVENOUS
Status: DISCONTINUED | OUTPATIENT
Start: 2017-12-08 | End: 2017-12-08 | Stop reason: HOSPADM

## 2017-12-08 RX ORDER — ACETAMINOPHEN AND CODEINE PHOSPHATE 300; 30 MG/1; MG/1
2 TABLET ORAL EVERY 4 HOURS PRN
Status: DISCONTINUED | OUTPATIENT
Start: 2017-12-08 | End: 2017-12-08 | Stop reason: HOSPADM

## 2017-12-08 RX ORDER — KETOROLAC TROMETHAMINE 30 MG/ML
INJECTION, SOLUTION INTRAMUSCULAR; INTRAVENOUS PRN
Status: DISCONTINUED | OUTPATIENT
Start: 2017-12-08 | End: 2017-12-08 | Stop reason: SDUPTHER

## 2017-12-08 RX ORDER — ONDANSETRON 2 MG/ML
4 INJECTION INTRAMUSCULAR; INTRAVENOUS
Status: DISCONTINUED | OUTPATIENT
Start: 2017-12-08 | End: 2017-12-08 | Stop reason: HOSPADM

## 2017-12-08 RX ORDER — FENTANYL CITRATE 50 UG/ML
INJECTION, SOLUTION INTRAMUSCULAR; INTRAVENOUS PRN
Status: DISCONTINUED | OUTPATIENT
Start: 2017-12-08 | End: 2017-12-08 | Stop reason: SDUPTHER

## 2017-12-08 RX ORDER — DEXTROSE, SODIUM CHLORIDE, SODIUM LACTATE, POTASSIUM CHLORIDE, AND CALCIUM CHLORIDE 5; .6; .31; .03; .02 G/100ML; G/100ML; G/100ML; G/100ML; G/100ML
INJECTION, SOLUTION INTRAVENOUS CONTINUOUS
Status: DISCONTINUED | OUTPATIENT
Start: 2017-12-08 | End: 2017-12-08 | Stop reason: HOSPADM

## 2017-12-08 RX ORDER — SODIUM CHLORIDE 0.9 % (FLUSH) 0.9 %
10 SYRINGE (ML) INJECTION EVERY 12 HOURS SCHEDULED
Status: DISCONTINUED | OUTPATIENT
Start: 2017-12-08 | End: 2017-12-08 | Stop reason: HOSPADM

## 2017-12-08 RX ADMIN — PROPOFOL 200 MG: 10 INJECTION, EMULSION INTRAVENOUS at 09:43

## 2017-12-08 RX ADMIN — MIDAZOLAM HYDROCHLORIDE 2 MG: 1 INJECTION, SOLUTION INTRAMUSCULAR; INTRAVENOUS at 09:38

## 2017-12-08 RX ADMIN — LIDOCAINE HYDROCHLORIDE 0.1 ML: 10 INJECTION, SOLUTION EPIDURAL; INFILTRATION; INTRACAUDAL; PERINEURAL at 07:51

## 2017-12-08 RX ADMIN — FENTANYL CITRATE 25 MCG: 50 INJECTION, SOLUTION INTRAMUSCULAR; INTRAVENOUS at 11:26

## 2017-12-08 RX ADMIN — FENTANYL CITRATE 100 MCG: 50 INJECTION, SOLUTION INTRAMUSCULAR; INTRAVENOUS at 09:43

## 2017-12-08 RX ADMIN — ROCURONIUM BROMIDE 10 MG: 10 INJECTION INTRAVENOUS at 10:06

## 2017-12-08 RX ADMIN — ONDANSETRON 4 MG: 2 INJECTION INTRAMUSCULAR; INTRAVENOUS at 09:55

## 2017-12-08 RX ADMIN — ACETAMINOPHEN AND CODEINE PHOSPHATE 2 TABLET: 300; 30 TABLET ORAL at 13:54

## 2017-12-08 RX ADMIN — FENTANYL CITRATE 25 MCG: 50 INJECTION, SOLUTION INTRAMUSCULAR; INTRAVENOUS at 11:30

## 2017-12-08 RX ADMIN — ROCURONIUM BROMIDE 10 MG: 10 INJECTION INTRAVENOUS at 10:11

## 2017-12-08 RX ADMIN — SUGAMMADEX 200 MG: 100 INJECTION, SOLUTION INTRAVENOUS at 11:15

## 2017-12-08 RX ADMIN — LIDOCAINE HYDROCHLORIDE 80 MG: 20 INJECTION, SOLUTION INFILTRATION; PERINEURAL at 09:43

## 2017-12-08 RX ADMIN — CEFAZOLIN SODIUM 2 G: 2 SOLUTION INTRAVENOUS at 09:53

## 2017-12-08 RX ADMIN — FENTANYL CITRATE 50 MCG: 50 INJECTION, SOLUTION INTRAMUSCULAR; INTRAVENOUS at 12:02

## 2017-12-08 RX ADMIN — HYDROMORPHONE HYDROCHLORIDE 0.5 MG: 1 INJECTION, SOLUTION INTRAMUSCULAR; INTRAVENOUS; SUBCUTANEOUS at 11:30

## 2017-12-08 RX ADMIN — HEPARIN SODIUM 5000 UNITS: 10000 INJECTION, SOLUTION INTRAVENOUS; SUBCUTANEOUS at 08:13

## 2017-12-08 RX ADMIN — KETOROLAC TROMETHAMINE 30 MG: 30 INJECTION, SOLUTION INTRAMUSCULAR; INTRAVENOUS at 11:26

## 2017-12-08 RX ADMIN — ROCURONIUM BROMIDE 40 MG: 10 INJECTION INTRAVENOUS at 09:43

## 2017-12-08 RX ADMIN — SODIUM CHLORIDE, POTASSIUM CHLORIDE, SODIUM LACTATE AND CALCIUM CHLORIDE: 600; 310; 30; 20 INJECTION, SOLUTION INTRAVENOUS at 07:52

## 2017-12-08 RX ADMIN — HYDROMORPHONE HYDROCHLORIDE 0.5 MG: 1 INJECTION, SOLUTION INTRAMUSCULAR; INTRAVENOUS; SUBCUTANEOUS at 11:34

## 2017-12-08 RX ADMIN — FENTANYL CITRATE 50 MCG: 50 INJECTION, SOLUTION INTRAMUSCULAR; INTRAVENOUS at 10:15

## 2017-12-08 RX ADMIN — DEXAMETHASONE SODIUM PHOSPHATE 4 MG: 4 INJECTION INTRA-ARTICULAR; INTRALESIONAL; INTRAMUSCULAR; INTRAVENOUS; SOFT TISSUE at 09:55

## 2017-12-08 ASSESSMENT — PULMONARY FUNCTION TESTS
PIF_VALUE: 24
PIF_VALUE: 25
PIF_VALUE: 21
PIF_VALUE: 20
PIF_VALUE: 23
PIF_VALUE: 1
PIF_VALUE: 20
PIF_VALUE: 19
PIF_VALUE: 20
PIF_VALUE: 23
PIF_VALUE: 21
PIF_VALUE: 21
PIF_VALUE: 2
PIF_VALUE: 21
PIF_VALUE: 22
PIF_VALUE: 19
PIF_VALUE: 23
PIF_VALUE: 19
PIF_VALUE: 21
PIF_VALUE: 0
PIF_VALUE: 19
PIF_VALUE: 19
PIF_VALUE: 20
PIF_VALUE: 21
PIF_VALUE: 22
PIF_VALUE: 23
PIF_VALUE: 23
PIF_VALUE: 19
PIF_VALUE: 21
PIF_VALUE: 1
PIF_VALUE: 22
PIF_VALUE: 21
PIF_VALUE: 19
PIF_VALUE: 21
PIF_VALUE: 23
PIF_VALUE: 22
PIF_VALUE: 20
PIF_VALUE: 21
PIF_VALUE: 22
PIF_VALUE: 19
PIF_VALUE: 20
PIF_VALUE: 21
PIF_VALUE: 1
PIF_VALUE: 23
PIF_VALUE: 21
PIF_VALUE: 20
PIF_VALUE: 19
PIF_VALUE: 21
PIF_VALUE: 21
PIF_VALUE: 25
PIF_VALUE: 4
PIF_VALUE: 20
PIF_VALUE: 19
PIF_VALUE: 22
PIF_VALUE: 16
PIF_VALUE: 2
PIF_VALUE: 21
PIF_VALUE: 22
PIF_VALUE: 21
PIF_VALUE: 23
PIF_VALUE: 20
PIF_VALUE: 22
PIF_VALUE: 23
PIF_VALUE: 19
PIF_VALUE: 23
PIF_VALUE: 26
PIF_VALUE: 21
PIF_VALUE: 21
PIF_VALUE: 20
PIF_VALUE: 19
PIF_VALUE: 20
PIF_VALUE: 21
PIF_VALUE: 19
PIF_VALUE: 19
PIF_VALUE: 23
PIF_VALUE: 21
PIF_VALUE: 25
PIF_VALUE: 26
PIF_VALUE: 20
PIF_VALUE: 0
PIF_VALUE: 4
PIF_VALUE: 20
PIF_VALUE: 21
PIF_VALUE: 4
PIF_VALUE: 18
PIF_VALUE: 21
PIF_VALUE: 19
PIF_VALUE: 21
PIF_VALUE: 23
PIF_VALUE: 19
PIF_VALUE: 20
PIF_VALUE: 29
PIF_VALUE: 25
PIF_VALUE: 19
PIF_VALUE: 20
PIF_VALUE: 19
PIF_VALUE: 10
PIF_VALUE: 21
PIF_VALUE: 5
PIF_VALUE: 7
PIF_VALUE: 21

## 2017-12-08 ASSESSMENT — PAIN DESCRIPTION - PAIN TYPE
TYPE: SURGICAL PAIN

## 2017-12-08 ASSESSMENT — PAIN SCALES - GENERAL
PAINLEVEL_OUTOF10: 4
PAINLEVEL_OUTOF10: 6
PAINLEVEL_OUTOF10: 2
PAINLEVEL_OUTOF10: 7
PAINLEVEL_OUTOF10: 6
PAINLEVEL_OUTOF10: 4
PAINLEVEL_OUTOF10: 5

## 2017-12-08 ASSESSMENT — PAIN DESCRIPTION - DESCRIPTORS
DESCRIPTORS: CRAMPING

## 2017-12-08 ASSESSMENT — PAIN DESCRIPTION - LOCATION
LOCATION: GROIN
LOCATION: GROIN
LOCATION: ABDOMEN;GROIN
LOCATION: GROIN
LOCATION: GROIN

## 2017-12-08 NOTE — ANESTHESIA POSTPROCEDURE EVALUATION
Department of Anesthesiology  Postprocedure Note    Patient: Xochilt Quintero  MRN: [id number]  YOB: 1975  Date of evaluation: 12/8/2017  Time:  11:36 AM     Procedure Summary     Date:  12/08/17 Room / Location:  [facility] OR 03 / [facility] OR    Anesthesia Start:  6120 Anesthesia Stop:      Procedures:       REPAIR OF VENTRAL HERNIA (N/A )      BILATERAL  HERNIA INGUINAL REPAIR (Bilateral ) Diagnosis:  (VENTRAL HERNIA)    Surgeon:  Lilo Bautista MD Responsible Provider:  Alexys Santos MD    Anesthesia Type:  general ASA Status:  1          Anesthesia Type: general    Tracy Phase I: Tracy Score: 10    Tracy Phase II:      Last vitals: Reviewed and per EMR flowsheets.        Anesthesia Post Evaluation    Patient location during evaluation: PACU  Patient participation: complete - patient participated  Level of consciousness: awake and alert  Pain score: 7  Nausea & Vomiting: no nausea and no vomiting  Complications: no  Cardiovascular status: hemodynamically stable  Respiratory status: acceptable, nonlabored ventilation, spontaneous ventilation and nasal cannula  Hydration status: stable

## 2017-12-08 NOTE — BRIEF OP NOTE
Brief Postoperative Note  ______________________________________________________________    Patient: Chelsey Rudolph  YOB: 1975  MRN: [id number]  Date of Procedure: 12/8/2017    Pre-Op Diagnosis: VENTRAL HERNIA    Post-Op Diagnosis: Same       Procedure(s):  REPAIR OF VENTRAL HERNIA  BILATERAL  HERNIA INGUINAL REPAIR    Anesthesia: Soheila Back    Surgeon(s):  Jax Newman MD    Staff:  First Assistant: Kassy Benton RN  Scrub Person First: Rosenda Osman; Marisa Zavala     Estimated Blood Loss: * No values recorded between 12/8/2017  9:39 AM and 12/8/2017 93:16 AM *     Complications: None    Specimens:   * No specimens in log *    Implants:    Implant Name Type Inv. Item Serial No.  Lot No. LRB No. Used   GRAFT 2020 [employer] [facility] SURG CONI GROIN SHT ST 3X6IN Mesh GRAFT 2020 [facility] SURG CONI GROIN SHT ST 3X6IN  CR [employer] [id number] Right 1   PATCH 2020 [employer] [facility] SURG CONI VENTRALEX MED CIR W/STRAP 2.5IN Mesh PATCH 2020 [facility] SURG CONI VENTRALEX MED CIR W/STRAP 2. 5IN  CR [employer] [id number] N/A 1   GRAFT 2020 [employer] [facility] SURG CONI GROIN SHT ST 3X6IN Mesh GRAFT 2020 [facility] SURG CONI GROIN SHT ST 3X6IN   CR [employer] [id number] Left 1         Drains:      Findings:     Jax Newman MD  Date: 12/8/2017  Time: 11:28 AM

## 2017-12-08 NOTE — OP NOTE
Annalee Prabhakar [address] [place] [address], [address] [facility]                                 OPERATIVE REPORT    PATIENT NAME: Georgina Lawrence                  :        1975  MED REC NO:   [id number]                            ROOM:  ACCOUNT NO:   [de-identified]                           ADMIT DATE: 2017  PROVIDER:     Dorene Sainz MD    DATE OF PROCEDURE:  2017    PREOPERATIVE DIAGNOSES:  Ventral hernia, bilateral inguinal hernias. POSTOPERATIVE DIAGNOSES:  Ventral hernia, bilateral inguinal hernias. PROCEDURES PERFORMED:  Repair of ventral hernia, placement of prosthetic  mesh, repair of right inguinal hernia, repair of left inguinal hernia. SURGEON:  Dorene Sainz MD    ASSISTANT:  Nurse. ANESTHESIA:  General.    INDICATIONS:  The patient is a 48-year-old male with hernias *------*, who  presents now for repair of all three lesions. Risks, benefits, and  indications for this were reviewed with him. Potential bleeding,  infection, MI, PE, death, etc., were reviewed. Anticipated convalescence  was reviewed. Use of mesh and prosthetic materials were reviewed. All  questions were answered and consent was obtained. The patient was taken to the operating room, placed on table in supine  position, general anesthesia was obtained. The abdomen was prepped and  draped in a sterile fashion. Time-out procedures were followed. Antibiotics were given. DVT prophylaxis was obtained using subcutaneous  heparin and placement of external pneumatic compression stockings. Attention was first directed to the umbilicus. Transverse incision was  made at the inferior aspect of the umbilicus and dissection carried out. Hernia mass was encountered and  from the periumbilical tissues  and freed up to the level of the fascia. The mass was now *------* further  explored with some chronically incarcerated preperitoneal fat. same incision exposure was  obtained. Note was made the patient is status post a prior left groin  exploration for testicular torsion. Some scar tissue at the cord was  encountered. The cord was sharply mobilized and controlled. Again, findings on this side again were for another direct hernia. Again,  there was no lipoma or indirect sac. Same repair and closure was  performed, as on the side were performed. The patient tolerated the procedures well.         Anjel Washington MD    D: 12/08/2017 11:41:53       T: 12/08/2017 13:04:32     [id number]  Job#: [id number]     Doc#: [id number]    CC:  Refugio Martinez MD

## 2017-12-08 NOTE — ANESTHESIA PRE PROCEDURE
Cardiovascular:Negative CV ROS             Beta Blocker:  Not on Beta Blocker         Neuro/Psych:   Negative Neuro/Psych ROS              GI/Hepatic/Renal: Neg GI/Hepatic/Renal ROS            Endo/Other: Negative Endo/Other ROS                    Abdominal:           Vascular:                                        Anesthesia Plan      general     ASA 1       Induction: intravenous. MIPS: Postoperative opioids intended and Prophylactic antiemetics administered. Anesthetic plan and risks discussed with patient. Plan discussed with CRNA.     Attending anesthesiologist reviewed and agrees with Myesha Larson MD   12/8/2017

## 2017-12-26 ENCOUNTER — OFFICE VISIT (OUTPATIENT)
Dept: SURGERY | Age: 42
End: 2017-12-26

## 2017-12-26 VITALS
SYSTOLIC BLOOD PRESSURE: 130 MMHG | HEIGHT: 71 IN | HEART RATE: 100 BPM | BODY MASS INDEX: 27.16 KG/M2 | WEIGHT: 194 LBS | RESPIRATION RATE: 12 BRPM | DIASTOLIC BLOOD PRESSURE: 90 MMHG

## 2017-12-26 DIAGNOSIS — K43.9 VENTRAL HERNIA WITHOUT OBSTRUCTION OR GANGRENE: Primary | ICD-10-CM

## 2017-12-26 DIAGNOSIS — K40.20 BILATERAL INGUINAL HERNIA WITHOUT OBSTRUCTION OR GANGRENE, RECURRENCE NOT SPECIFIED: ICD-10-CM

## 2017-12-26 PROCEDURE — 99024 POSTOP FOLLOW-UP VISIT: CPT | Performed by: SURGERY

## 2017-12-26 NOTE — PROGRESS NOTES
The patient is s/p repair of bilateral inguinal hernias and ventral hernia on December, 8,2017. The hernia was repaired using laparoscopic technique with mesh. The hernia was repaired using open technique with mesh. Patient is convalescing well . Pain control is good. The patient is usingNSAID    GI function is normal   function is normal    Pathology report shows no unexpected findings. PE:  The wound is clean, dry, intact    Impression :  Stable p-op    Plan:  The patient is not ready for heavy lifting and unrestricted activity. May return to light duty if available. Follow up in 6 weeks.

## 2018-01-23 ENCOUNTER — OFFICE VISIT (OUTPATIENT)
Dept: SURGERY | Age: 43
End: 2018-01-23

## 2018-01-23 VITALS
WEIGHT: 192.4 LBS | RESPIRATION RATE: 14 BRPM | DIASTOLIC BLOOD PRESSURE: 80 MMHG | BODY MASS INDEX: 26.83 KG/M2 | SYSTOLIC BLOOD PRESSURE: 124 MMHG | HEART RATE: 98 BPM

## 2018-01-23 DIAGNOSIS — K40.20 BILATERAL INGUINAL HERNIA WITHOUT OBSTRUCTION OR GANGRENE, RECURRENCE NOT SPECIFIED: Primary | ICD-10-CM

## 2018-01-23 PROCEDURE — 99024 POSTOP FOLLOW-UP VISIT: CPT | Performed by: SURGERY

## 2018-03-13 ENCOUNTER — OFFICE VISIT (OUTPATIENT)
Dept: SURGERY | Age: 43
End: 2018-03-13

## 2018-03-13 VITALS
DIASTOLIC BLOOD PRESSURE: 85 MMHG | BODY MASS INDEX: 27.14 KG/M2 | SYSTOLIC BLOOD PRESSURE: 118 MMHG | OXYGEN SATURATION: 98 % | WEIGHT: 194.6 LBS | HEART RATE: 87 BPM

## 2018-03-13 DIAGNOSIS — K40.20 BILATERAL INGUINAL HERNIA WITHOUT OBSTRUCTION OR GANGRENE, RECURRENCE NOT SPECIFIED: Primary | ICD-10-CM

## 2018-03-13 PROCEDURE — 99024 POSTOP FOLLOW-UP VISIT: CPT | Performed by: SURGERY

## 2018-04-14 ENCOUNTER — OFFICE VISIT (OUTPATIENT)
Dept: FAMILY MEDICINE CLINIC | Age: 43
End: 2018-04-14
Payer: COMMERCIAL

## 2018-04-14 VITALS
TEMPERATURE: 97.7 F | WEIGHT: 197.4 LBS | SYSTOLIC BLOOD PRESSURE: 122 MMHG | HEIGHT: 70 IN | RESPIRATION RATE: 24 BRPM | HEART RATE: 97 BPM | DIASTOLIC BLOOD PRESSURE: 82 MMHG | BODY MASS INDEX: 28.26 KG/M2

## 2018-04-14 DIAGNOSIS — R81 GLUCOSURIA: ICD-10-CM

## 2018-04-14 DIAGNOSIS — R35.0 URINARY FREQUENCY: Primary | ICD-10-CM

## 2018-04-14 DIAGNOSIS — N34.2 URETHRITIS: ICD-10-CM

## 2018-04-14 LAB
ANION GAP SERPL CALCULATED.3IONS-SCNC: 16 MEQ/L (ref 7–13)
BILIRUBIN, POC: NORMAL
BLOOD URINE, POC: NORMAL
BUN BLDV-MCNC: 16 MG/DL (ref 6–20)
CALCIUM SERPL-MCNC: 9.3 MG/DL (ref 8.6–10.2)
CHLORIDE BLD-SCNC: 100 MEQ/L (ref 98–107)
CLARITY, POC: NORMAL
CO2: 26 MEQ/L (ref 22–29)
COLOR, POC: YELLOW
CREAT SERPL-MCNC: 0.98 MG/DL (ref 0.7–1.2)
GFR AFRICAN AMERICAN: >60
GFR NON-AFRICAN AMERICAN: >60
GLUCOSE BLD-MCNC: 85 MG/DL (ref 74–109)
GLUCOSE URINE, POC: NORMAL
HBA1C MFR BLD: 5.1 % (ref 4.8–5.9)
KETONES, POC: NORMAL
LEUKOCYTE EST, POC: NORMAL
NITRITE, POC: NORMAL
PH, POC: 5.5
POTASSIUM SERPL-SCNC: 4.2 MEQ/L (ref 3.5–5.1)
PROTEIN, POC: NORMAL
SODIUM BLD-SCNC: 142 MEQ/L (ref 132–144)
SPECIFIC GRAVITY, POC: 1.02
UROBILINOGEN, POC: NORMAL

## 2018-04-14 PROCEDURE — 99213 OFFICE O/P EST LOW 20 MIN: CPT | Performed by: FAMILY MEDICINE

## 2018-04-14 PROCEDURE — 81003 URINALYSIS AUTO W/O SCOPE: CPT | Performed by: FAMILY MEDICINE

## 2018-04-14 PROCEDURE — 1036F TOBACCO NON-USER: CPT | Performed by: FAMILY MEDICINE

## 2018-04-14 PROCEDURE — G8419 CALC BMI OUT NRM PARAM NOF/U: HCPCS | Performed by: FAMILY MEDICINE

## 2018-04-14 PROCEDURE — G8427 DOCREV CUR MEDS BY ELIG CLIN: HCPCS | Performed by: FAMILY MEDICINE

## 2018-04-14 RX ORDER — CEFDINIR 300 MG/1
300 CAPSULE ORAL 2 TIMES DAILY
Qty: 20 CAPSULE | Refills: 0 | Status: SHIPPED | OUTPATIENT
Start: 2018-04-14 | End: 2018-04-24

## 2018-04-14 ASSESSMENT — PATIENT HEALTH QUESTIONNAIRE - PHQ9
SUM OF ALL RESPONSES TO PHQ QUESTIONS 1-9: 0
1. LITTLE INTEREST OR PLEASURE IN DOING THINGS: 0
2. FEELING DOWN, DEPRESSED OR HOPELESS: 0
SUM OF ALL RESPONSES TO PHQ9 QUESTIONS 1 & 2: 0

## 2018-04-14 ASSESSMENT — ENCOUNTER SYMPTOMS
CONSTIPATION: 0
SHORTNESS OF BREATH: 0
COUGH: 0
EYES NEGATIVE: 1
ABDOMINAL PAIN: 0
DIARRHEA: 0
NAUSEA: 0

## 2018-04-16 ENCOUNTER — TELEPHONE (OUTPATIENT)
Dept: FAMILY MEDICINE CLINIC | Age: 43
End: 2018-04-16

## 2018-04-16 DIAGNOSIS — R35.0 URINARY FREQUENCY: Primary | ICD-10-CM

## 2019-02-11 ENCOUNTER — OFFICE VISIT (OUTPATIENT)
Dept: FAMILY MEDICINE CLINIC | Age: 44
End: 2019-02-11
Payer: COMMERCIAL

## 2019-02-11 VITALS
HEIGHT: 70 IN | DIASTOLIC BLOOD PRESSURE: 82 MMHG | TEMPERATURE: 98 F | WEIGHT: 192.2 LBS | BODY MASS INDEX: 27.52 KG/M2 | HEART RATE: 67 BPM | SYSTOLIC BLOOD PRESSURE: 128 MMHG | RESPIRATION RATE: 18 BRPM | OXYGEN SATURATION: 98 %

## 2019-02-11 DIAGNOSIS — H61.23 BILATERAL HEARING LOSS DUE TO CERUMEN IMPACTION: Primary | ICD-10-CM

## 2019-02-11 PROCEDURE — 69209 REMOVE IMPACTED EAR WAX UNI: CPT | Performed by: NURSE PRACTITIONER

## 2019-02-11 PROCEDURE — G8419 CALC BMI OUT NRM PARAM NOF/U: HCPCS | Performed by: NURSE PRACTITIONER

## 2019-02-11 PROCEDURE — G8427 DOCREV CUR MEDS BY ELIG CLIN: HCPCS | Performed by: NURSE PRACTITIONER

## 2019-02-11 PROCEDURE — 1036F TOBACCO NON-USER: CPT | Performed by: NURSE PRACTITIONER

## 2019-02-11 PROCEDURE — G8484 FLU IMMUNIZE NO ADMIN: HCPCS | Performed by: NURSE PRACTITIONER

## 2019-02-11 PROCEDURE — 99213 OFFICE O/P EST LOW 20 MIN: CPT | Performed by: NURSE PRACTITIONER

## 2019-02-11 ASSESSMENT — PATIENT HEALTH QUESTIONNAIRE - PHQ9
SUM OF ALL RESPONSES TO PHQ QUESTIONS 1-9: 0
SUM OF ALL RESPONSES TO PHQ QUESTIONS 1-9: 0
SUM OF ALL RESPONSES TO PHQ9 QUESTIONS 1 & 2: 0
2. FEELING DOWN, DEPRESSED OR HOPELESS: 0
1. LITTLE INTEREST OR PLEASURE IN DOING THINGS: 0

## 2019-02-11 ASSESSMENT — ENCOUNTER SYMPTOMS
DIARRHEA: 0
SINUS PRESSURE: 0
EYE REDNESS: 0
SORE THROAT: 0
SINUS PAIN: 0
EYE PAIN: 0
CHEST TIGHTNESS: 0
CONSTIPATION: 0
COUGH: 0
WHEEZING: 0
SHORTNESS OF BREATH: 0
RHINORRHEA: 0
VOMITING: 0
EYE DISCHARGE: 0
NAUSEA: 0
EYE ITCHING: 0
TROUBLE SWALLOWING: 0

## 2019-03-21 ENCOUNTER — TELEPHONE (OUTPATIENT)
Dept: FAMILY MEDICINE CLINIC | Age: 44
End: 2019-03-21

## 2019-03-21 ENCOUNTER — TELEPHONE (OUTPATIENT)
Dept: ADMINISTRATIVE | Age: 44
End: 2019-03-21

## 2019-04-10 ENCOUNTER — TELEPHONE (OUTPATIENT)
Dept: ENDOCRINOLOGY | Age: 44
End: 2019-04-10

## 2020-02-13 ENCOUNTER — OFFICE VISIT (OUTPATIENT)
Dept: FAMILY MEDICINE CLINIC | Age: 45
End: 2020-02-13
Payer: COMMERCIAL

## 2020-02-13 VITALS
HEIGHT: 70 IN | DIASTOLIC BLOOD PRESSURE: 76 MMHG | SYSTOLIC BLOOD PRESSURE: 128 MMHG | HEART RATE: 105 BPM | WEIGHT: 189 LBS | BODY MASS INDEX: 27.06 KG/M2 | OXYGEN SATURATION: 98 % | TEMPERATURE: 97.3 F | RESPIRATION RATE: 12 BRPM

## 2020-02-13 PROBLEM — Z00.00 ENCOUNTER FOR MEDICAL EXAMINATION TO ESTABLISH CARE: Status: ACTIVE | Noted: 2020-02-13

## 2020-02-13 PROBLEM — R10.9 ABDOMINAL PAIN: Status: ACTIVE | Noted: 2020-02-13

## 2020-02-13 PROBLEM — J34.89 SINUS PAIN: Status: ACTIVE | Noted: 2020-02-13

## 2020-02-13 PROCEDURE — G8427 DOCREV CUR MEDS BY ELIG CLIN: HCPCS | Performed by: NURSE PRACTITIONER

## 2020-02-13 PROCEDURE — 99214 OFFICE O/P EST MOD 30 MIN: CPT | Performed by: NURSE PRACTITIONER

## 2020-02-13 PROCEDURE — G8419 CALC BMI OUT NRM PARAM NOF/U: HCPCS | Performed by: NURSE PRACTITIONER

## 2020-02-13 PROCEDURE — G8484 FLU IMMUNIZE NO ADMIN: HCPCS | Performed by: NURSE PRACTITIONER

## 2020-02-13 PROCEDURE — 1036F TOBACCO NON-USER: CPT | Performed by: NURSE PRACTITIONER

## 2020-02-13 RX ORDER — AZITHROMYCIN 250 MG/1
TABLET, FILM COATED ORAL
Qty: 6 TABLET | Refills: 0 | Status: SHIPPED | OUTPATIENT
Start: 2020-02-13 | End: 2022-01-02

## 2020-02-13 NOTE — PROGRESS NOTES
12   Ht 5' 10\" (1.778 m)   Wt 189 lb (85.7 kg)   SpO2 98%   BMI 27.12 kg/m²     BP Readings from Last 3 Encounters:   02/13/20 128/76   02/11/19 128/82   04/14/18 122/82         Wt Readings from Last 3 Encounters:   02/13/20 189 lb (85.7 kg)   02/11/19 192 lb 3.2 oz (87.2 kg)   04/14/18 197 lb 6.4 oz (89.5 kg)     PHQ-9 Depression Screen  (complete in flowsheet section ofchart)    PHQ Scores 2/13/2020 2/11/2019 4/14/2018 1/17/2017 10/11/2016   PHQ2 Score 0 0 0 0 0   PHQ9 Score 0 0 0 0 0     Interpretation of Total Score Depression Severity:1-4 = Minimal depression, 5-9 = Mild depression, 10-14 = Moderate depression, 15-19 = Moderately severe depression, 20-27 = Severe depression    If any problems reported, how difficult have these problems made it foryou to get things done or get along with other people? 0 - Normal difficulty    Personal, Social, Family History and Allergies    Patient's medications, allergies, past medical, surgical, social and family histories were reviewed and updated as appropriate. ROS    Review of Systems   Constitutional: Negative for activity change, appetite change, chills, diaphoresis, fatigue, fever and unexpected weight change. HENT: Positive for congestion, postnasal drip and sinus pain. Negative for drooling, ear discharge, ear pain, hearing loss, rhinorrhea, sinus pressure, sneezing, sore throat and trouble swallowing. Eyes: Negative for pain, discharge, redness and itching. Respiratory: Positive for cough. Negative for chest tightness, shortness of breath and wheezing. Cardiovascular: Negative for chest pain, palpitations and leg swelling. Gastrointestinal: Positive for abdominal pain. Negative for blood in stool, constipation, diarrhea, nausea, rectal pain and vomiting. Endocrine: Negative for cold intolerance, heat intolerance, polydipsia, polyphagia and polyuria.    Genitourinary: Negative for discharge, flank pain, penile pain, penile swelling, scrotal posterior auricular or occipital adenopathy. Cervical: No cervical adenopathy. Upper Body:      Right upper body: No supraclavicular adenopathy. Left upper body: No supraclavicular adenopathy. Skin:     General: Skin is warm and dry. Neurological:      Mental Status: He is alert and oriented to person, place, and time. Psychiatric:         Speech: Speech normal.         Behavior: Behavior normal.         Thought Content: Thought content normal.         Judgment: Judgment normal.       Assessment and Treatment     Diagnosis Orders   1. Abdominal pain, unspecified abdominal location     2. Encounter for medical examination to establish care     3. Sinus pain  azithromycin (ZITHROMAX) 250 MG tablet   4. Cough  azithromycin (ZITHROMAX) 250 MG tablet     Plan and Follow Up    No orders of the defined types were placed in this encounter. Orders Placed This Encounter   Medications    azithromycin (ZITHROMAX) 250 MG tablet     Sig: Take 2 tablets on day one and 1 tab day 2-5     Dispense:  6 tablet     Refill:  0       Return in about 3 weeks (around 3/5/2020). Abdominal pain: Patient would like to hold off on CT scan discussed diverticulitis with patient and a diverticulitis diet. Advised patient to remove nuts and seeds popcorn from his diet to see if the lower abdominal pain resolves. I advised patient if fever chills never doubled over in pain abdominal bloating occurs he should go to the ER right away if no resolution in discomfort by removing these products from the diet patient should give me a call so we can get the CT going. Patient should follow-up if symptoms do not resolve    Discussed in great detail sinus medication how to take it patient requested azithromycin and states that is what typically works well for him.   Advised to follow-up if symptoms do not resolve    Patient to follow with HUSSAIN Ferreira CNP for all chronic condition management    Reviewed with the patient: current clinical status, medications, activities and diet. Side effects, adverse effects of the medicationprescribed today, as well as treatment plan/ rationale and result expectations have been discussed with the patient who expresses understanding and desires to proceed. Close follow up to evaluate treatment resultsand for coordination of care. I have reviewed the patient's medical history in detail and updated the computerized patient record. HUSSAIN Sweet - CNP      No future appointments. Attending Supervising Physicians Attestation Statement  The patient met the criteria for indirect supervision. I reviewed the findings and plans of the Nurse Practitioner and agree as documented in her note .     Electronically signed by Veronica Le DO on 2/18/20 at 1:26 PM

## 2020-02-14 ASSESSMENT — ENCOUNTER SYMPTOMS
TROUBLE SWALLOWING: 0
VOMITING: 0
CHEST TIGHTNESS: 0
EYE DISCHARGE: 0
COUGH: 1
EYE ITCHING: 0
NAUSEA: 0
DIARRHEA: 0
RECTAL PAIN: 0
SHORTNESS OF BREATH: 0
EYE REDNESS: 0
BLOOD IN STOOL: 0
SORE THROAT: 0
WHEEZING: 0
EYE PAIN: 0
SINUS PRESSURE: 0
ABDOMINAL PAIN: 1
RHINORRHEA: 0
SINUS PAIN: 1
CONSTIPATION: 0

## 2020-03-14 PROBLEM — Z00.00 ENCOUNTER FOR MEDICAL EXAMINATION TO ESTABLISH CARE: Status: RESOLVED | Noted: 2020-02-13 | Resolved: 2020-03-14

## 2021-12-23 ENCOUNTER — VIRTUAL VISIT (OUTPATIENT)
Dept: PRIMARY CARE CLINIC | Age: 46
End: 2021-12-23
Payer: COMMERCIAL

## 2021-12-23 ENCOUNTER — OFFICE VISIT (OUTPATIENT)
Dept: FAMILY MEDICINE CLINIC | Age: 46
End: 2021-12-23
Payer: COMMERCIAL

## 2021-12-23 VITALS
BODY MASS INDEX: 26.88 KG/M2 | SYSTOLIC BLOOD PRESSURE: 136 MMHG | WEIGHT: 192 LBS | OXYGEN SATURATION: 99 % | HEIGHT: 71 IN | HEART RATE: 85 BPM | TEMPERATURE: 97.3 F | DIASTOLIC BLOOD PRESSURE: 92 MMHG

## 2021-12-23 DIAGNOSIS — Z13.1 SCREENING FOR DIABETES MELLITUS (DM): ICD-10-CM

## 2021-12-23 DIAGNOSIS — J06.9 ACUTE URI: ICD-10-CM

## 2021-12-23 DIAGNOSIS — Z00.00 ENCOUNTER FOR MEDICAL EXAMINATION TO ESTABLISH CARE: Primary | ICD-10-CM

## 2021-12-23 DIAGNOSIS — J06.9 URTI (ACUTE UPPER RESPIRATORY INFECTION): Primary | ICD-10-CM

## 2021-12-23 PROCEDURE — 87426 SARSCOV CORONAVIRUS AG IA: CPT | Performed by: INTERNAL MEDICINE

## 2021-12-23 PROCEDURE — 99213 OFFICE O/P EST LOW 20 MIN: CPT | Performed by: INTERNAL MEDICINE

## 2021-12-23 PROCEDURE — G8484 FLU IMMUNIZE NO ADMIN: HCPCS | Performed by: FAMILY MEDICINE

## 2021-12-23 PROCEDURE — 99386 PREV VISIT NEW AGE 40-64: CPT | Performed by: FAMILY MEDICINE

## 2021-12-23 PROCEDURE — G8427 DOCREV CUR MEDS BY ELIG CLIN: HCPCS | Performed by: INTERNAL MEDICINE

## 2021-12-23 PROCEDURE — 87804 INFLUENZA ASSAY W/OPTIC: CPT | Performed by: INTERNAL MEDICINE

## 2021-12-23 RX ORDER — GUAIFENESIN AND CODEINE PHOSPHATE 100; 10 MG/5ML; MG/5ML
10 SOLUTION ORAL 2 TIMES DAILY PRN
Qty: 118 ML | Refills: 0 | Status: SHIPPED | OUTPATIENT
Start: 2021-12-23 | End: 2021-12-30

## 2021-12-23 RX ORDER — FLUTICASONE PROPIONATE 50 MCG
1 SPRAY, SUSPENSION (ML) NASAL DAILY
Qty: 32 G | Refills: 1 | Status: SHIPPED | OUTPATIENT
Start: 2021-12-23 | End: 2022-01-02

## 2021-12-23 SDOH — ECONOMIC STABILITY: TRANSPORTATION INSECURITY
IN THE PAST 12 MONTHS, HAS LACK OF TRANSPORTATION KEPT YOU FROM MEETINGS, WORK, OR FROM GETTING THINGS NEEDED FOR DAILY LIVING?: NO

## 2021-12-23 SDOH — ECONOMIC STABILITY: FOOD INSECURITY: WITHIN THE PAST 12 MONTHS, YOU WORRIED THAT YOUR FOOD WOULD RUN OUT BEFORE YOU GOT MONEY TO BUY MORE.: NEVER TRUE

## 2021-12-23 SDOH — ECONOMIC STABILITY: FOOD INSECURITY: WITHIN THE PAST 12 MONTHS, THE FOOD YOU BOUGHT JUST DIDN'T LAST AND YOU DIDN'T HAVE MONEY TO GET MORE.: NEVER TRUE

## 2021-12-23 SDOH — ECONOMIC STABILITY: TRANSPORTATION INSECURITY
IN THE PAST 12 MONTHS, HAS THE LACK OF TRANSPORTATION KEPT YOU FROM MEDICAL APPOINTMENTS OR FROM GETTING MEDICATIONS?: NO

## 2021-12-23 ASSESSMENT — ENCOUNTER SYMPTOMS
SORE THROAT: 0
DIARRHEA: 0
ABDOMINAL PAIN: 0
RHINORRHEA: 0
WHEEZING: 0
RHINORRHEA: 0
ABDOMINAL PAIN: 0
SINUS PAIN: 0
COUGH: 1
COUGH: 0
SINUS PRESSURE: 1
SORE THROAT: 0
NAUSEA: 0
SHORTNESS OF BREATH: 1
WHEEZING: 0
VOMITING: 0
DIARRHEA: 0
SHORTNESS OF BREATH: 0
CONSTIPATION: 0
SINUS PRESSURE: 1

## 2021-12-23 ASSESSMENT — PATIENT HEALTH QUESTIONNAIRE - PHQ9
1. LITTLE INTEREST OR PLEASURE IN DOING THINGS: 0
SUM OF ALL RESPONSES TO PHQ QUESTIONS 1-9: 0
2. FEELING DOWN, DEPRESSED OR HOPELESS: 0
SUM OF ALL RESPONSES TO PHQ QUESTIONS 1-9: 0
SUM OF ALL RESPONSES TO PHQ9 QUESTIONS 1 & 2: 0
SUM OF ALL RESPONSES TO PHQ QUESTIONS 1-9: 0

## 2021-12-23 ASSESSMENT — SOCIAL DETERMINANTS OF HEALTH (SDOH): HOW HARD IS IT FOR YOU TO PAY FOR THE VERY BASICS LIKE FOOD, HOUSING, MEDICAL CARE, AND HEATING?: NOT HARD AT ALL

## 2021-12-23 NOTE — PROGRESS NOTES
2021    TELEHEALTH EVALUATION -- Audio/Visual (During PAMBZ-55 public health emergency)    HPI:    Cali Sykes (:  1975) has requested an audio/video evaluation for the following concern(s):    Pt presents with 3 days of sinus and chest congestion. Assoc cough productive of clear sputum. No chest pain but there's SOB with exertion and wheezing. No fever or chills, no ear pain or sore throat. No known sick or COVID contacts. No itchy eyes or nose. No know sick or COVID contacts. Not yet vaccinated against COVID or flu. Review of Systems   Constitutional: Negative for chills, diaphoresis, fatigue and fever. HENT: Positive for congestion and sinus pressure. Negative for ear discharge, ear pain, rhinorrhea, sinus pain, sneezing and sore throat. Respiratory: Positive for cough and shortness of breath. Negative for wheezing. Cardiovascular: Negative for chest pain. Gastrointestinal: Negative for abdominal pain, diarrhea, nausea and vomiting. Endocrine: Negative for cold intolerance and heat intolerance. Genitourinary: Negative for dysuria and frequency. Neurological: Negative for dizziness and light-headedness. Prior to Visit Medications    Medication Sig Taking? Authorizing Provider   guaiFENesin-codeine (TUSSI-ORGANIDIN NR) 100-10 MG/5ML syrup Take 10 mLs by mouth 2 times daily as needed for Cough or Congestion for up to 7 days.  Yes Mike Sultana MD   fluticasone [facility]) 50 MCG/ACT nasal spray 1 spray by Each Nostril route daily Yes Mike Sultana MD   azithromycin (ZITHROMAX) 250 MG tablet Take 2 tablets on day one and 1 tab day 2-5  Christy Bank, APRN - CNP       Social History     Tobacco Use    Smoking status: Former Smoker     Packs/day: 0.50     Years: 8.00     Pack years: 4.00     Types: Cigarettes     Start date: 1993     Quit date: 2001     Years since quittin.9    Smokeless tobacco: Never Used   Substance Use Topics    Alcohol use: Yes     Comment: Drinks beer occassionally.  Drug use: No     No Known Allergies, History reviewed. No pertinent past medical history. ,   Past Surgical History:   Procedure Laterality Date    HERNIA REPAIR Bilateral 2017    BILATERAL  HERNIA INGUINAL REPAIR performed by Bill Meléndez MD at [facility] SCRN NOT  [address] [facility] N/A 2017    COLONOSCOPY performed by Emili Bourne MD at [address] N/A 2017    REPAIR OF VENTRAL HERNIA performed by Bill Meléndez MD at [address] [facility]     ,   Social History     Tobacco Use    Smoking status: Former Smoker     Packs/day: 0.50     Years: 8.00     Pack years: 4.00     Types: Cigarettes     Start date: 1993     Quit date: 2001     Years since quittin.9    Smokeless tobacco: Never Used   Substance Use Topics    Alcohol use: Yes     Comment: Drinks beer occassionally.  Drug use: No       PHYSICAL EXAMINATION:  [ INSTRUCTIONS:  \"[x]\" Indicates a positive item  \"[]\" Indicates a negative item  -- DELETE ALL ITEMS NOT EXAMINED]  Vital Signs: (As obtained by patient/caregiver or practitioner observation)    Blood pressure-  Heart rate-    Respiratory rate-    Temperature-  Pulse oximetry-   Constitutional: [] Appears well-developed and well-nourished [] No apparent distress      [] Abnormal-   Mental status  [x] Alert and awake  [x] Oriented to person/place/time []Able to follow commands    Eyes:  EOM    []  Normal  [] Abnormal-  Sclera  []  Normal  [] Abnormal -         Discharge []  None visible  [] Abnormal -    HENT:   [] Normocephalic, atraumatic.   [] Abnormal   [] Mouth/Throat: Mucous membranes are moist.     External Ears [] Normal  [] Abnormal-     Neck: [] No visualized mass     Pulmonary/Chest: [x] Respiratory effort normal.  [x] No visualized signs of difficulty breathing or respiratory distress        [] Abnormal-      Musculoskeletal:   [] Normal gait with no signs of ataxia         [] Normal range of motion of neck        [] Abnormal-       Neurological:        [] No Facial Asymmetry (Cranial nerve 7 motor function) (limited exam to video visit)          [] No gaze palsy        [] Abnormal-         Skin:        [] No significant exanthematous lesions or discoloration noted on facial skin         [] Abnormal-            Psychiatric:       [] Normal Affect [] No Hallucinations        [] Abnormal-     Other pertinent observable physical exam findings-     ASSESSMENT/PLAN:  1. URTI (acute upper respiratory infection)  - POCT COVID-19, Antigen  - POCT Influenza A/B  - guaiFENesin-codeine (TUSSI-ORGANIDIN NR) 100-10 MG/5ML syrup; Take 10 mLs by mouth 2 times daily as needed for Cough or Congestion for up to 7 days. Dispense: 118 mL; Refill: 0  - fluticasone (FLONASE) 50 MCG/ACT nasal spray; 1 spray by Each Nostril route daily  Dispense: 32 g; Refill: 1      No follow-ups on file. Noé Parra, was evaluated through a synchronous (real-time) audio-video encounter. The patient (or guardian if applicable) is aware that this is a billable service. Verbal consent to proceed has been obtained within the past 12 months. The visit was conducted pursuant to the emergency declaration under the [address] [place] [address] authority and the [employer] and [employer] Act. Patient identification was verified, and a caregiver was present when appropriate. The patient was located in a state where the provider was credentialed to provide care. Total time spent on this encounter: Not billed by time    --Arlington Canavan, MD on 12/23/2021 at 12:57 PM    An electronic signature was used to authenticate this note.

## 2021-12-23 NOTE — PROGRESS NOTES
[address] [facility] [address] [facility] PRIMARY CARE  [address] [place] [address]  Dept: [phone]  Dept Fax: [phone]  Loc: [phone]     Chief Complaint  Chief Complaint   Patient presents with    Drainage     nasal congestion, fatigue, faint sense smell, x3 days       HPI:  51 y.o.male who presents for the following:  (used to see Dr. 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 Tameka Rosales for URI symptoms; he would like a 2nd opinion as he is used to getting a zpak for sinus infection; x3days with sinus drainage and pressure improving; able to smell and taste; no fevers; no n/v.     Review of Systems   Constitutional: Negative for chills and fever. HENT: Positive for sinus pressure. Negative for congestion, rhinorrhea and sore throat. Respiratory: Negative for cough, shortness of breath and wheezing. Gastrointestinal: Negative for abdominal pain, constipation and diarrhea. Endocrine: Negative for polydipsia and polyuria. Genitourinary: Negative for dysuria, frequency and urgency. Neurological: Negative for syncope, light-headedness, numbness and headaches. Psychiatric/Behavioral: Negative for sleep disturbance. The patient is not nervous/anxious. No past medical history on file.   Past Surgical History:   Procedure Laterality Date    HERNIA REPAIR Bilateral 12/8/2017    BILATERAL  HERNIA INGUINAL REPAIR performed by Kameron Osman MD at [address] [facility] SCRN NOT  [address] [place] [address] N/A 9/18/2017    COLONOSCOPY performed by Andrade Mensah MD at [address] N/A 12/8/2017    REPAIR OF VENTRAL HERNIA performed by Kameron Osman MD at [address] [facility]       Social History     Socioeconomic History    Marital status: Single     Spouse name: Not on file    Number of children: 0    Years of education: Not on file    Highest education level: Not on file   Occupational History    Occupation:      Comment: [employer] Tobacco Use    Smoking status: Former Smoker     Packs/day: 0.50     Years: 8.00     Pack years: 4.00     Types: Cigarettes     Start date: 1993     Quit date: 2001     Years since quittin.9    Smokeless tobacco: Never Used   Substance and Sexual Activity    Alcohol use: Yes     Comment: Drinks beer occassionally.  Drug use: No    Sexual activity: Not on file   Other Topics Concern    Not on file   Social History Narrative    Not on file     Social Determinants of Health     Financial Resource Strain: Low Risk     Difficulty of Paying Living Expenses: Not hard at all   Food Insecurity: No Food Insecurity    Worried About Running Out of Food in the Last Year: Never true    Artem of Food in the Last Year: Never true   Transportation Needs: No Transportation Needs    Lack of Transportation (Medical): No    Lack of Transportation (Non-Medical):  No   Physical Activity:     Days of Exercise per Week: Not on file    Minutes of Exercise per Session: Not on file   Stress:     Feeling of Stress : Not on file   Social Connections:     Frequency of Communication with Friends and Family: Not on file    Frequency of Social Gatherings with Friends and Family: Not on file    Attends Taoist Services: Not on file    Active Member of Clubs or Organizations: Not on file    Attends Club or Organization Meetings: Not on file    Marital Status: Not on file   Intimate Partner Violence:     Fear of Current or Ex-Partner: Not on file    Emotionally Abused: Not on file    Physically Abused: Not on file    Sexually Abused: Not on file   Housing Stability:     Unable to Pay for Housing in the Last Year: Not on file    Number of [place] in the Last Year: Not on file    Unstable Housing in the Last Year: Not on file     Family History Problem Relation Age of Onset    Heart Disease Father     Heart Disease Paternal Grandfather     No Known Problems Sister     No Known Problems Sister       No Known Allergies  Current Outpatient Medications   Medication Sig Dispense Refill    guaiFENesin-codeine (TUSSI-ORGANIDIN NR) 100-10 MG/5ML syrup Take 10 mLs by mouth 2 times daily as needed for Cough or Congestion for up to 7 days. (Patient not taking: Reported on 12/23/2021) 118 mL 0    fluticasone (FLONASE) 50 MCG/ACT nasal spray 1 spray by Each Nostril route daily (Patient not taking: Reported on 12/23/2021) 32 g 1    azithromycin (ZITHROMAX) 250 MG tablet Take 2 tablets on day one and 1 tab day 2-5 (Patient not taking: Reported on 12/23/2021) 6 tablet 0     No current facility-administered medications for this visit. Vitals:    12/23/21 1321 12/23/21 1427   BP: (!) 136/92 (!) 136/92   Pulse: 85    Temp: 97.3 °F (36.3 °C)    TempSrc: Infrared    SpO2: 99%    Weight: 192 lb (87.1 kg)    Height: 5' 11\" (1.803 m)        Physical exam:  Physical Exam  Vitals reviewed. Constitutional:       General: He is not in acute distress. Appearance: He is well-developed. HENT:      Head: Normocephalic and atraumatic. Right Ear: Tympanic membrane, ear canal and external ear normal.      Left Ear: Tympanic membrane, ear canal and external ear normal.      Mouth/Throat:      Pharynx: No oropharyngeal exudate. Neck:      Thyroid: No thyromegaly. Cardiovascular:      Rate and Rhythm: Normal rate and regular rhythm. Heart sounds: Normal heart sounds. No murmur heard. Pulmonary:      Effort: Pulmonary effort is normal. No respiratory distress. Breath sounds: Normal breath sounds. No wheezing. Abdominal:      General: There is no distension. Palpations: Abdomen is soft. Tenderness: There is no abdominal tenderness. There is no guarding or rebound. Musculoskeletal:      Cervical back: Normal range of motion. Lymphadenopathy:      Cervical: No cervical adenopathy. Skin:     General: Skin is warm and dry. Neurological:      Mental Status: He is alert and oriented to person, place, and time. Psychiatric:         Behavior: Behavior normal.         Assessment/Plan:  51 y.o. male here mainly for establish and sinus issue:  - Sinus symptoms are minor and improving; discussed that this is possibly an early viral process and abx aren't needed and zpak isn't 1st/2nd/3rd line for sinusitis; should give this more time to run it's course; he was quite understanding  - routine labs and screenings     Diagnosis Orders   1. Encounter for medical examination to establish care  Lipid, Fasting    Comprehensive Metabolic Panel, Fasting    CBC   2. Acute URI     3. Screening for diabetes mellitus (DM)  Hemoglobin A1C        Return if symptoms worsen or fail to improve.     Jai Rosenberg MD

## 2021-12-30 ENCOUNTER — NURSE ONLY (OUTPATIENT)
Dept: FAMILY MEDICINE CLINIC | Age: 46
End: 2021-12-30
Payer: COMMERCIAL

## 2021-12-30 ENCOUNTER — HOSPITAL ENCOUNTER (OUTPATIENT)
Age: 46
Setting detail: SPECIMEN
Discharge: HOME | End: 2021-12-30
Payer: COMMERCIAL

## 2021-12-30 DIAGNOSIS — Z01.89 ROUTINE LAB DRAW: ICD-10-CM

## 2021-12-30 DIAGNOSIS — Z13.1 SCREENING FOR DIABETES MELLITUS (DM): ICD-10-CM

## 2021-12-30 DIAGNOSIS — Z00.00 ENCOUNTER FOR MEDICAL EXAMINATION TO ESTABLISH CARE: ICD-10-CM

## 2021-12-30 LAB
ALBUMIN SERPL-MCNC: 4.4 G/DL (ref 3.5–4.6)
ALP BLD-CCNC: 55 U/L (ref 35–104)
ALT SERPL-CCNC: 20 U/L (ref 0–41)
ANION GAP SERPL CALCULATED.3IONS-SCNC: 8 MEQ/L (ref 9–15)
AST SERPL-CCNC: 26 U/L (ref 0–40)
BILIRUB SERPL-MCNC: 0.3 MG/DL (ref 0.2–0.7)
BUN BLDV-MCNC: 19 MG/DL (ref 6–20)
CALCIUM SERPL-MCNC: 9.6 MG/DL (ref 8.5–9.9)
CHLORIDE BLD-SCNC: 104 MEQ/L (ref 95–107)
CHOLESTEROL, FASTING: 229 MG/DL (ref 0–199)
CO2: 28 MEQ/L (ref 20–31)
CREAT SERPL-MCNC: 1.1 MG/DL (ref 0.7–1.2)
GFR AFRICAN AMERICAN: >60
GFR NON-AFRICAN AMERICAN: >60
GLOBULIN: 2.7 G/DL (ref 2.3–3.5)
GLUCOSE FASTING: 93 MG/DL (ref 70–99)
HCT VFR BLD CALC: 44.7 % (ref 42–52)
HDLC SERPL-MCNC: 55 MG/DL (ref 40–59)
HEMOGLOBIN: 15.1 G/DL (ref 14–18)
LDL CHOLESTEROL CALCULATED: 157 MG/DL (ref 0–129)
MCH RBC QN AUTO: 31.5 PG (ref 27–31.3)
MCHC RBC AUTO-ENTMCNC: 33.9 % (ref 33–37)
MCV RBC AUTO: 92.9 FL (ref 80–100)
PDW BLD-RTO: 12.8 % (ref 11.5–14.5)
PLATELET # BLD: 254 K/UL (ref 130–400)
POTASSIUM SERPL-SCNC: 4.7 MEQ/L (ref 3.4–4.9)
RBC # BLD: 4.81 M/UL (ref 4.7–6.1)
SODIUM BLD-SCNC: 140 MEQ/L (ref 135–144)
TOTAL PROTEIN: 7.1 G/DL (ref 6.3–8)
TRIGLYCERIDE, FASTING: 87 MG/DL (ref 0–150)
WBC # BLD: 6.8 K/UL (ref 4.8–10.8)

## 2021-12-30 PROCEDURE — 85027 COMPLETE CBC AUTOMATED: CPT

## 2021-12-30 PROCEDURE — 80053 COMPREHEN METABOLIC PANEL: CPT

## 2021-12-30 PROCEDURE — 83036 HEMOGLOBIN GLYCOSYLATED A1C: CPT

## 2021-12-30 PROCEDURE — 80061 LIPID PANEL: CPT

## 2021-12-30 PROCEDURE — 36415 COLL VENOUS BLD VENIPUNCTURE: CPT | Performed by: FAMILY MEDICINE

## 2021-12-31 LAB — HBA1C MFR BLD: 4.8 % (ref 4.8–5.9)

## 2022-01-17 ENCOUNTER — OFFICE VISIT (OUTPATIENT)
Dept: FAMILY MEDICINE CLINIC | Age: 47
End: 2022-01-17
Payer: COMMERCIAL

## 2022-01-17 VITALS
DIASTOLIC BLOOD PRESSURE: 86 MMHG | SYSTOLIC BLOOD PRESSURE: 118 MMHG | WEIGHT: 192 LBS | HEIGHT: 71 IN | BODY MASS INDEX: 26.88 KG/M2 | TEMPERATURE: 97 F | HEART RATE: 94 BPM | OXYGEN SATURATION: 99 %

## 2022-01-17 DIAGNOSIS — J06.9 URI WITH COUGH AND CONGESTION: Primary | ICD-10-CM

## 2022-01-17 LAB
Lab: NORMAL
PERFORMING INSTRUMENT: NORMAL
QC PASS/FAIL: NORMAL
SARS-COV-2, POC: NORMAL

## 2022-01-17 PROCEDURE — 87426 SARSCOV CORONAVIRUS AG IA: CPT | Performed by: NURSE PRACTITIONER

## 2022-01-17 PROCEDURE — 99213 OFFICE O/P EST LOW 20 MIN: CPT | Performed by: NURSE PRACTITIONER

## 2022-01-17 PROCEDURE — 1036F TOBACCO NON-USER: CPT | Performed by: NURSE PRACTITIONER

## 2022-01-17 PROCEDURE — G8419 CALC BMI OUT NRM PARAM NOF/U: HCPCS | Performed by: NURSE PRACTITIONER

## 2022-01-17 PROCEDURE — G8427 DOCREV CUR MEDS BY ELIG CLIN: HCPCS | Performed by: NURSE PRACTITIONER

## 2022-01-17 PROCEDURE — G8484 FLU IMMUNIZE NO ADMIN: HCPCS | Performed by: NURSE PRACTITIONER

## 2022-01-17 RX ORDER — BENZONATATE 100 MG/1
100-200 CAPSULE ORAL 3 TIMES DAILY PRN
Qty: 60 CAPSULE | Refills: 0 | Status: SHIPPED | OUTPATIENT
Start: 2022-01-17 | End: 2022-01-24

## 2022-01-17 RX ORDER — FLUTICASONE PROPIONATE 50 MCG
1 SPRAY, SUSPENSION (ML) NASAL DAILY
COMMUNITY
End: 2022-06-01

## 2022-01-17 RX ORDER — DEXTROMETHORPHAN HYDROBROMIDE AND PROMETHAZINE HYDROCHLORIDE 15; 6.25 MG/5ML; MG/5ML
5 SYRUP ORAL 4 TIMES DAILY PRN
Qty: 150 ML | Refills: 0 | Status: SHIPPED | OUTPATIENT
Start: 2022-01-17 | End: 2022-01-24

## 2022-01-17 RX ORDER — NEOMYCIN SULFATE, POLYMYXIN B SULFATE AND DEXAMETHASONE 3.5; 10000; 1 MG/ML; [USP'U]/ML; MG/ML
SUSPENSION/ DROPS OPHTHALMIC
COMMUNITY
Start: 2022-01-07 | End: 2022-06-01

## 2022-01-17 ASSESSMENT — ENCOUNTER SYMPTOMS
SHORTNESS OF BREATH: 0
ABDOMINAL PAIN: 0
DIARRHEA: 0
NAUSEA: 0
SINUS PAIN: 1
RHINORRHEA: 1
WHEEZING: 0
VOMITING: 0
SORE THROAT: 1
CHEST TIGHTNESS: 0
SINUS PRESSURE: 1
COUGH: 1

## 2022-01-17 NOTE — PROGRESS NOTES
[address] PRIMARY CARE  [address] [address]  Dept: [phone]  Dept Fax: [phone]  Loc: [phone]     Subjective     Grace Graver 51 y.o. male presents 1/17/22 with   Chief Complaint   Patient presents with    Cough     fatigue, nasal congestion, yellow mucus, x5 days       HPI     Patient presents for URI with cough/congestion. Admits he has been intermittently sick since end of December. Feels that he worsened middle of last week. He is waking up at night with coughing fits, productive cough with green phlegm. Still with some congestion, PND, sinus pain/pressure. Symptoms are worse at night and in the morning. He has been using cough syrup with codeine with no relief. No known exposure to COVID or flu. Reviewed the following history:    No past medical history on file. Past Surgical History:   Procedure Laterality Date    HERNIA REPAIR Bilateral 12/8/2017    BILATERAL  HERNIA INGUINAL REPAIR performed by Tez Oneal MD at [address] [place] [address] SCRN NOT  [address] [place] [address] N/A 9/18/2017    COLONOSCOPY performed by Lina Solis MD at [address] [facility] N/A 12/8/2017    REPAIR OF VENTRAL HERNIA performed by Tez Oneal MD at [address] [facility]       Family History   Problem Relation Age of Onset    Heart Disease Father     Heart Disease Paternal Grandfather     No Known Problems Sister     No Known Problems Sister        No Known Allergies    Current Outpatient Medications on File Prior to Visit   Medication Sig Dispense Refill    ciclopirox (LOPROX) 0.77 % cream APPLY THIN FILM TO AFFECTED AREA(S) ONCE DAILY.       neomycin-polymyxin-dexameth (MAXITROL) 3.5-69251-5.1 ophthalmic suspension       fluticasone (FLONASE) 50 MCG/ACT nasal spray 1 spray by Each Nostril route daily       No current facility-administered medications on file prior to visit. Review of Systems   Constitutional: Negative for chills and fever. HENT: Positive for congestion, postnasal drip, rhinorrhea, sinus pressure, sinus pain and sore throat. Negative for ear pain. Respiratory: Positive for cough (productive). Negative for chest tightness, shortness of breath and wheezing. Cardiovascular: Negative for chest pain. Gastrointestinal: Negative for abdominal pain, diarrhea, nausea and vomiting. Musculoskeletal: Negative for myalgias. Objective    Vitals:    01/17/22 1105   BP: 118/86   Pulse: 94   Temp: 97 °F (36.1 °C)   TempSrc: Infrared   SpO2: 99%   Weight: 192 lb (87.1 kg)   Height: 5' 11\" (1.803 m)       Physical Exam  Constitutional:       General: He is not in acute distress. Appearance: Normal appearance. He is not ill-appearing or toxic-appearing. HENT:      Head: Normocephalic and atraumatic. Right Ear: Hearing and external ear normal.      Left Ear: Hearing and external ear normal.      Nose:      Right Sinus: Maxillary sinus tenderness present. No frontal sinus tenderness. Left Sinus: Maxillary sinus tenderness present. No frontal sinus tenderness. Eyes:      General: Lids are normal.      Conjunctiva/sclera: Conjunctivae normal.   Cardiovascular:      Rate and Rhythm: Normal rate and regular rhythm. Heart sounds: Normal heart sounds. Pulmonary:      Effort: Pulmonary effort is normal. No respiratory distress. Breath sounds: Normal breath sounds. No decreased breath sounds, wheezing, rhonchi or rales. Musculoskeletal:      Cervical back: Normal range of motion and neck supple. Skin:     General: Skin is warm and dry. Neurological:      General: No focal deficit present. Mental Status: He is alert and oriented to person, place, and time.    Psychiatric:         Attention and Perception: Attention normal.         Mood and Affect: Mood normal.         Speech: Speech normal.         Behavior: Behavior normal.         Thought Content: Thought content normal.         Cognition and Memory: Cognition normal.         Judgment: Judgment normal.       POC Testing Today: No results found for this visit on 01/17/22. Assessment and Plan    Jo Ann Meza 51 y.o. male presenting for URI    1. URI with cough and congestion  - Discussed likely viral at this time, follow up if symptoms persist.  - POCT COVID-19, Antigen  - promethazine-dextromethorphan (PROMETHAZINE-DM) 6.25-15 MG/5ML syrup; Take 5 mLs by mouth 4 times daily as needed for Cough  Dispense: 150 mL; Refill: 0  - benzonatate (TESSALON) 100 MG capsule; Take 1-2 capsules by mouth 3 times daily as needed for Cough  Dispense: 60 capsule; Refill: 0      Procedures:  Unless otherwise noted below, none    Return if symptoms worsen or fail to improve, for follow up. Side effects and adverse effects of any medication prescribed today, as well as treatment plan/rationale, follow-up care, and result expectations have been discussed with the patient. Expresses understanding and desires to proceed with treatment plan. The patient was reminded that if an antibiotic has been prescribed the predicted course is improvement to cure with no persistent issues. Take antibiotics as directed. If any problems occur, an appointment should be made or ER visit if severe. Because of the risk with ANY antibiotic of C. Difficile colitis if persistent diarrhea or abdominal pain or any concerning symptoms, we should be notified. To reduce this risk, a probiotic pill, yogurt or other preparations containing active cultures should be ingested daily -particularly while on the antibiotic. If any persistent symptoms of illness, follow up appointment should be made in a timely fashion with a physician. Discussed signs and symptoms which require immediate follow-up in ED/call to 911. Understanding verbalized.     I have reviewed and updated the electronic medical record.     Nan Peoples, APRN - CNP

## 2022-01-19 ENCOUNTER — TELEPHONE (OUTPATIENT)
Dept: FAMILY MEDICINE CLINIC | Age: 47
End: 2022-01-19

## 2022-01-19 DIAGNOSIS — J01.90 ACUTE SINUSITIS, RECURRENCE NOT SPECIFIED, UNSPECIFIED LOCATION: Primary | ICD-10-CM

## 2022-01-19 RX ORDER — AMOXICILLIN AND CLAVULANATE POTASSIUM 875; 125 MG/1; MG/1
1 TABLET, FILM COATED ORAL 2 TIMES DAILY
Qty: 20 TABLET | Refills: 0 | Status: SHIPPED | OUTPATIENT
Start: 2022-01-19 | End: 2022-01-29

## 2022-01-19 NOTE — TELEPHONE ENCOUNTER
Patient called to report that he is not feeling any better since his appointment on 1/17/22; still having same symptoms and he feels it is getting worse. Patient states that the provider told him to call and an antibiotic (Z-pack) would be called in for him.

## 2022-03-23 RX ORDER — POLYETHYLENE GLYCOL 3350, SODIUM CHLORIDE, SODIUM BICARBONATE, POTASSIUM CHLORIDE 420; 11.2; 5.72; 1.48 G/4L; G/4L; G/4L; G/4L
4000 POWDER, FOR SOLUTION ORAL ONCE
Qty: 4000 ML | Refills: 0 | Status: SHIPPED | OUTPATIENT
Start: 2022-03-23 | End: 2022-03-23

## 2022-03-23 NOTE — TELEPHONE ENCOUNTER
Patient scheduled for colonoscopy 04/11/22. Rx requested:  Requested Prescriptions     Pending Prescriptions Disp Refills    polyethylene glycol-electrolytes (TRILYTE) 420 g solution 4000 mL 0     Sig: Take 4,000 mLs by mouth once for 1 dose CALL [phone] FOR ISSUES WITH INSURANCE         Last Office Visit:   Visit date not found      Next Visit Date:  No future appointments.

## 2022-04-11 ENCOUNTER — HOSPITAL ENCOUNTER (OUTPATIENT)
Age: 47
Setting detail: OUTPATIENT SURGERY
Discharge: HOME | End: 2022-04-11
Attending: SPECIALIST | Admitting: SPECIALIST
Payer: COMMERCIAL

## 2022-04-11 ENCOUNTER — ANCILLARY PROCEDURE (OUTPATIENT)
Dept: ENDOSCOPY | Age: 47
End: 2022-04-11
Attending: SPECIALIST
Payer: COMMERCIAL

## 2022-04-11 ENCOUNTER — ANESTHESIA EVENT (OUTPATIENT)
Dept: ENDOSCOPY | Age: 47
End: 2022-04-11
Payer: COMMERCIAL

## 2022-04-11 ENCOUNTER — ANESTHESIA (OUTPATIENT)
Dept: ENDOSCOPY | Age: 47
End: 2022-04-11
Payer: COMMERCIAL

## 2022-04-11 VITALS
HEIGHT: 71 IN | HEART RATE: 72 BPM | BODY MASS INDEX: 25.48 KG/M2 | DIASTOLIC BLOOD PRESSURE: 69 MMHG | OXYGEN SATURATION: 96 % | TEMPERATURE: 97.2 F | SYSTOLIC BLOOD PRESSURE: 115 MMHG | RESPIRATION RATE: 16 BRPM | WEIGHT: 182 LBS

## 2022-04-11 VITALS
OXYGEN SATURATION: 96 % | RESPIRATION RATE: 21 BRPM | DIASTOLIC BLOOD PRESSURE: 70 MMHG | SYSTOLIC BLOOD PRESSURE: 110 MMHG

## 2022-04-11 PROBLEM — Z12.11 SCREENING FOR MALIGNANT NEOPLASM OF COLON: Status: ACTIVE | Noted: 2020-02-13

## 2022-04-11 PROCEDURE — 3609027000 HC COLONOSCOPY: Performed by: SPECIALIST

## 2022-04-11 PROCEDURE — 2500000003 HC RX 250 WO HCPCS: Performed by: NURSE ANESTHETIST, CERTIFIED REGISTERED

## 2022-04-11 PROCEDURE — 88305 TISSUE EXAM BY PATHOLOGIST: CPT

## 2022-04-11 PROCEDURE — 2580000003 HC RX 258

## 2022-04-11 PROCEDURE — 3700000001 HC ADD 15 MINUTES (ANESTHESIA): Performed by: SPECIALIST

## 2022-04-11 PROCEDURE — 3700000000 HC ANESTHESIA ATTENDED CARE: Performed by: SPECIALIST

## 2022-04-11 PROCEDURE — 7100000011 HC PHASE II RECOVERY - ADDTL 15 MIN: Performed by: SPECIALIST

## 2022-04-11 PROCEDURE — 6370000000 HC RX 637 (ALT 250 FOR IP): Performed by: SPECIALIST

## 2022-04-11 PROCEDURE — 6360000002 HC RX W HCPCS: Performed by: NURSE ANESTHETIST, CERTIFIED REGISTERED

## 2022-04-11 PROCEDURE — 45380 COLONOSCOPY AND BIOPSY: CPT | Performed by: SPECIALIST

## 2022-04-11 PROCEDURE — 7100000010 HC PHASE II RECOVERY - FIRST 15 MIN: Performed by: SPECIALIST

## 2022-04-11 PROCEDURE — 2580000003 HC RX 258: Performed by: SPECIALIST

## 2022-04-11 PROCEDURE — 2709999900 HC NON-CHARGEABLE SUPPLY: Performed by: SPECIALIST

## 2022-04-11 RX ORDER — LIDOCAINE HYDROCHLORIDE 20 MG/ML
INJECTION, SOLUTION INFILTRATION; PERINEURAL PRN
Status: DISCONTINUED | OUTPATIENT
Start: 2022-04-11 | End: 2022-04-11 | Stop reason: SDUPTHER

## 2022-04-11 RX ORDER — SODIUM CHLORIDE 9 MG/ML
INJECTION, SOLUTION INTRAVENOUS
Status: COMPLETED
Start: 2022-04-11 | End: 2022-04-11

## 2022-04-11 RX ORDER — SODIUM CHLORIDE 9 MG/ML
INJECTION, SOLUTION INTRAVENOUS PRN
Status: DISCONTINUED | OUTPATIENT
Start: 2022-04-11 | End: 2022-04-11 | Stop reason: HOSPADM

## 2022-04-11 RX ORDER — SODIUM CHLORIDE 9 MG/ML
INJECTION, SOLUTION INTRAVENOUS CONTINUOUS
Status: DISCONTINUED | OUTPATIENT
Start: 2022-04-11 | End: 2022-04-11 | Stop reason: HOSPADM

## 2022-04-11 RX ORDER — SODIUM CHLORIDE 0.9 % (FLUSH) 0.9 %
5-40 SYRINGE (ML) INJECTION EVERY 12 HOURS SCHEDULED
Status: DISCONTINUED | OUTPATIENT
Start: 2022-04-11 | End: 2022-04-11 | Stop reason: HOSPADM

## 2022-04-11 RX ORDER — PROPOFOL 10 MG/ML
INJECTION, EMULSION INTRAVENOUS PRN
Status: DISCONTINUED | OUTPATIENT
Start: 2022-04-11 | End: 2022-04-11 | Stop reason: SDUPTHER

## 2022-04-11 RX ORDER — SIMETHICONE 20 MG/.3ML
EMULSION ORAL PRN
Status: DISCONTINUED | OUTPATIENT
Start: 2022-04-11 | End: 2022-04-11 | Stop reason: ALTCHOICE

## 2022-04-11 RX ORDER — SODIUM CHLORIDE 0.9 % (FLUSH) 0.9 %
5-40 SYRINGE (ML) INJECTION PRN
Status: DISCONTINUED | OUTPATIENT
Start: 2022-04-11 | End: 2022-04-11 | Stop reason: HOSPADM

## 2022-04-11 RX ORDER — MAGNESIUM HYDROXIDE 1200 MG/15ML
LIQUID ORAL PRN
Status: DISCONTINUED | OUTPATIENT
Start: 2022-04-11 | End: 2022-04-11 | Stop reason: ALTCHOICE

## 2022-04-11 RX ADMIN — SODIUM CHLORIDE: 9 INJECTION, SOLUTION INTRAVENOUS at 08:11

## 2022-04-11 RX ADMIN — PROPOFOL 600 MG: 10 INJECTION, EMULSION INTRAVENOUS at 09:06

## 2022-04-11 RX ADMIN — LIDOCAINE HYDROCHLORIDE 60 MG: 20 INJECTION, SOLUTION INFILTRATION; PERINEURAL at 09:06

## 2022-04-11 ASSESSMENT — PAIN SCALES - GENERAL
PAINLEVEL_OUTOF10: 0

## 2022-04-11 ASSESSMENT — PAIN DESCRIPTION - DESCRIPTORS: DESCRIPTORS: SQUEEZING

## 2022-04-11 ASSESSMENT — PAIN - FUNCTIONAL ASSESSMENT: PAIN_FUNCTIONAL_ASSESSMENT: 0-10

## 2022-04-11 NOTE — ANESTHESIA POSTPROCEDURE EVALUATION
Department of Anesthesiology  Postprocedure Note    Patient: Coral Ybarra  MRN: [id number]  YOB: 1975  Date of evaluation: 4/11/2022  Time:  9:25 AM     Procedure Summary     Date: 04/11/22 Room / Location: [facility] OR 01 / [facility]    Anesthesia Start: 2817 Anesthesia Stop:     Procedure: COLORECTAL CANCER SCREENING, w/polypectomy (N/A ) Diagnosis: (History of colon polyps Z86.010)    Surgeons: Charity Lawrence MD Responsible Provider: HUSSAIN Canales CRNA    Anesthesia Type: MAC ASA Status: 1          Anesthesia Type: No value filed. Tracy Phase I: Tracy Score: 10    Tracy Phase II:      Last vitals: Reviewed and per EMR flowsheets.        Anesthesia Post Evaluation    Patient location during evaluation: bedside  Patient participation: complete - patient participated  Level of consciousness: awake and awake and alert  Pain score: 0  Airway patency: patent  Nausea & Vomiting: no nausea and no vomiting  Complications: no  Cardiovascular status: blood pressure returned to baseline and hemodynamically stable  Respiratory status: acceptable and spontaneous ventilation  Hydration status: euvolemic

## 2022-04-11 NOTE — ANESTHESIA PRE PROCEDURE
Department of Anesthesiology  Preprocedure Note       Name:  Siobhan See   Age:  51 y.o.  :  1975                                          MRN:  [id number]         Date:  2022      Surgeon: Albania Rivero):  Marlo Schilling MD    Procedure: Procedure(s):  COLORECTAL CANCER SCREENING, HIGH RISK    Medications prior to admission:   Prior to Admission medications    Medication Sig Start Date End Date Taking? Authorizing Provider   ciclopirox (LOPROX) 0.77 % cream APPLY THIN FILM TO AFFECTED AREA(S) ONCE DAILY. 21   Historical Provider, MD   neomycin-polymyxin-dexameth (MAXITROL) 3.5-16120-0.1 ophthalmic suspension  22   Historical Provider, MD   fluticasone (FLONASE) 50 MCG/ACT nasal spray 1 spray by Each Nostril route daily    Historical Provider, MD       Current medications:    No current facility-administered medications for this encounter. Current Outpatient Medications   Medication Sig Dispense Refill    ciclopirox (LOPROX) 0.77 % cream APPLY THIN FILM TO AFFECTED AREA(S) ONCE DAILY.  neomycin-polymyxin-dexameth (MAXITROL) 3.5-68057-8.1 ophthalmic suspension       fluticasone (FLONASE) 50 MCG/ACT nasal spray 1 spray by Each Nostril route daily         Allergies:  No Known Allergies    Problem List:    Patient Active Problem List   Diagnosis Code    Bilateral inguinal hernia without obstruction or gangrene K40.20    Ventral hernia without obstruction or gangrene K43.9    Bilateral hearing loss due to cerumen impaction H61.23    Abdominal pain R10.9    Sinus pain J34.89       Past Medical History:  No past medical history on file.     Past Surgical History:        Procedure Laterality Date    HERNIA REPAIR Bilateral 2017    BILATERAL  HERNIA INGUINAL REPAIR performed by Teena Mcbride MD at [facility] SCRN NOT  [address] [facility] N/A 2017    COLONOSCOPY performed by Marlo Schilling MD at [address] N/A 2017    REPAIR OF VENTRAL HERNIA performed by Ingrid Palomo MD at [address] [place] [address]         Social History:    Social History     Tobacco Use    Smoking status: Former Smoker     Packs/day: 0.50     Years: 8.00     Pack years: 4.00     Types: Cigarettes     Start date: 1993     Quit date: 2001     Years since quittin.2    Smokeless tobacco: Never Used   Substance Use Topics    Alcohol use: Yes     Comment: Drinks beer occassionally. Counseling given: Not Answered      Vital Signs (Current): There were no vitals filed for this visit. BP Readings from Last 3 Encounters:   22 118/86   21 (!) 136/92   20 128/76       NPO Status:                                                                                 BMI:   Wt Readings from Last 3 Encounters:   22 192 lb (87.1 kg)   21 192 lb (87.1 kg)   20 189 lb (85.7 kg)     There is no height or weight on file to calculate BMI.    CBC:   Lab Results   Component Value Date    WBC 6.8 2021    RBC 4.81 2021    HGB 15.1 2021    HCT 44.7 2021    MCV 92.9 2021    RDW 12.8 2021     2021       CMP:   Lab Results   Component Value Date     2021    K 4.7 2021     2021    CO2 28 2021    BUN 19 2021    CREATININE 1.10 2021    GFRAA >60.0 2021    LABGLOM >60.0 2021    GLUCOSE 85 2018    PROT 7.1 2021    CALCIUM 9.6 2021    BILITOT 0.3 2021    ALKPHOS 55 2021    AST 26 2021    ALT 20 2021       POC Tests: No results for input(s): POCGLU, POCNA, POCK, POCCL, POCBUN, POCHEMO, POCHCT in the last 72 hours.     Coags:   Lab Results   Component Value Date    PROTIME 11.2 2017    INR 1.1 2017    APTT 26.1 2017       HCG (If Applicable): No results found for: PREGTESTUR, PREGSERUM, HCG, HCGQUANT     ABGs: No results found for: PHART, PO2ART, ROO2NGE, PUS6BPK, BEART, A3DTZEGN     Type & Screen (If Applicable):  No results found for: LABABO, LABRH    Drug/Infectious Status (If Applicable):  No results found for: HIV, HEPCAB    COVID-19 Screening (If Applicable):   Lab Results   Component Value Date    COVID19 Not-Detected 01/17/2022           Anesthesia Evaluation  Patient summary reviewed and Nursing notes reviewed  Airway: Mallampati: II  TM distance: >3 FB   Neck ROM: full  Mouth opening: > = 3 FB Dental:          Pulmonary:Negative Pulmonary ROS and normal exam  breath sounds clear to auscultation                             Cardiovascular:Negative CV ROS            Rhythm: regular  Rate: normal                    Neuro/Psych:   Negative Neuro/Psych ROS              GI/Hepatic/Renal:   (+) bowel prep,           Endo/Other: Negative Endo/Other ROS                    Abdominal:             Vascular: negative vascular ROS. Other Findings:             Anesthesia Plan      MAC     ASA 1       Induction: intravenous. Anesthetic plan and risks discussed with patient. Plan discussed with attending.                   HUSSAIN Pradhan - CRNA   4/11/2022

## 2022-04-11 NOTE — H&P
Patient Name: Coral Ybarra  : 1975  MRN: [id number]  DATE: 22      ENDOSCOPY  History and Physical    Procedure:    [] Diagnostic Colonoscopy       [x] Screening Colonoscopy  [] EGD      [] ERCP      [] EUS       [] Other    [x] Previous office notes/History and Physical reviewed from the patients chart. Please see EMR for further details of HPI. I have examined the patient's status immediately prior to the procedure and:      Indications/HPI:    []Abdominal Pain  []Cancer- GI/Lung  []Fhx of colon CA/polyps  []History of Polyps  []Bacons   []Melena  []Abnormal Imaging  []Dysphagia    []Persistent Pneumonia  []Anemia  []Food Impaction  []History of Polyps  []GI Bleed  []Pulmonary nodule/Mass  []Change in bowel habits []Heartburn/Reflux  []Rectal Bleed (BRBPR)  []Chest Pain - Non Cardiac []Heme (+) Stoo  l[]Ulcers  []Constipation  []Hemoptysis   []Varices  []Diarrhea  []Hypoxemia  []Nausea/Vomiting  []Screening   []Crohns/Colitis  []Other:     Anesthesia:   [x] MAC [] Moderate Sedation   [] General   [] None     ROS: 12 pt Review of Symptoms was negative unless mentioned above    Medications:   Prior to Admission medications    Medication Sig Start Date End Date Taking? Authorizing Provider   ciclopirox (LOPROX) 0.77 % cream APPLY THIN FILM TO AFFECTED AREA(S) ONCE DAILY. Patient not taking: Reported on 21   Historical Provider, MD   neomycin-polymyxin-dexameth (MAXITROL) 3.5-77877-5.1 ophthalmic suspension  22   Historical Provider, MD   fluticasone (FLONASE) 50 MCG/ACT nasal spray 1 spray by Each Nostril route daily    Historical Provider, MD       Allergies: No Known Allergies     History of allergic reaction to anesthesia:  No    Past Medical History:  History reviewed. No pertinent past medical history.     Past Surgical History:  Past Surgical History:   Procedure Laterality Date    COLONOSCOPY      Dr Anthony Reis, had polyps    HERNIA REPAIR Bilateral 2017 BILATERAL  HERNIA INGUINAL REPAIR performed by Myrna Shepherd MD [facility] [id number] [facility] [address] [facility] N/A 2017    COLONOSCOPY performed by Johnathan Shields MD at [address] N/A 2017    REPAIR OF VENTRAL HERNIA [facility]         Social History:  Social History     Tobacco Use    Smoking status: Former Smoker     Packs/day: 0.50     Years: 8.00     Pack years: 4.00     Types: Cigarettes     Start date: 1993     Quit date: 2001     Years since quittin.2    Smokeless tobacco: Never Used   Vaping Use    Vaping Use: Never used   Substance Use Topics    Alcohol use: Yes     Comment: Drinks beer occassionally.  Drug use: No       Vital Signs:   Vitals:    22 0756   BP: 138/77   Pulse: 99   Resp: 18   Temp: 97.2 °F (36.2 °C)   SpO2: 100%        Physical Exam:  Cardiac:  [x]WNL  []Comments:  Pulmonary:  [x]WNL   []Comments:   Neuro/Mental Status:  [x]WNL  []Comments:  Abdominal:  [x]WNL    []Comments:  Other:   []WNL  []Comments:    Informed Consent:  The risks and benefits of the procedure have been discussed with either the patient or if they cannot consent, their representative. Assessment:  Patient examined and appropriate for planned sedation and procedure. Plan:  Proceed with planned sedation and procedure as above.     Johnathan Shields MD  9:05 AM Post-Care Instructions: I reviewed with the patient in detail post-care instructions. Patient is to keep the biopsy site dry overnight, and then apply bacitracin twice daily until healed. Patient may apply hydrogen peroxide soaks to remove any crusting.

## 2022-05-11 PROBLEM — Z12.11 SCREENING FOR MALIGNANT NEOPLASM OF COLON: Status: RESOLVED | Noted: 2020-02-13 | Resolved: 2022-05-11

## 2022-06-01 ENCOUNTER — OFFICE VISIT (OUTPATIENT)
Dept: FAMILY MEDICINE CLINIC | Age: 47
End: 2022-06-01
Payer: COMMERCIAL

## 2022-06-01 ENCOUNTER — HOSPITAL ENCOUNTER (OUTPATIENT)
Age: 47
Discharge: HOME | End: 2022-06-03
Payer: COMMERCIAL

## 2022-06-01 ENCOUNTER — HOSPITAL ENCOUNTER (OUTPATIENT)
Dept: GENERAL RADIOLOGY | Age: 47
Discharge: HOME | End: 2022-06-03
Payer: COMMERCIAL

## 2022-06-01 VITALS
HEIGHT: 71 IN | WEIGHT: 183 LBS | DIASTOLIC BLOOD PRESSURE: 70 MMHG | HEART RATE: 80 BPM | TEMPERATURE: 97.3 F | BODY MASS INDEX: 25.62 KG/M2 | SYSTOLIC BLOOD PRESSURE: 112 MMHG | OXYGEN SATURATION: 98 %

## 2022-06-01 DIAGNOSIS — M25.512 PAIN AND SWELLING OF LEFT SHOULDER: Primary | ICD-10-CM

## 2022-06-01 DIAGNOSIS — M25.412 PAIN AND SWELLING OF LEFT SHOULDER: Primary | ICD-10-CM

## 2022-06-01 DIAGNOSIS — M25.412 PAIN AND SWELLING OF LEFT SHOULDER: ICD-10-CM

## 2022-06-01 DIAGNOSIS — M25.512 PAIN AND SWELLING OF LEFT SHOULDER: ICD-10-CM

## 2022-06-01 PROCEDURE — G8427 DOCREV CUR MEDS BY ELIG CLIN: HCPCS | Performed by: NURSE PRACTITIONER

## 2022-06-01 PROCEDURE — 99213 OFFICE O/P EST LOW 20 MIN: CPT | Performed by: NURSE PRACTITIONER

## 2022-06-01 PROCEDURE — G8419 CALC BMI OUT NRM PARAM NOF/U: HCPCS | Performed by: NURSE PRACTITIONER

## 2022-06-01 PROCEDURE — 1036F TOBACCO NON-USER: CPT | Performed by: NURSE PRACTITIONER

## 2022-06-01 PROCEDURE — 73030 X-RAY EXAM OF SHOULDER: CPT

## 2022-06-01 ASSESSMENT — PATIENT HEALTH QUESTIONNAIRE - PHQ9
SUM OF ALL RESPONSES TO PHQ QUESTIONS 1-9: 0
2. FEELING DOWN, DEPRESSED OR HOPELESS: 0
1. LITTLE INTEREST OR PLEASURE IN DOING THINGS: 0
SUM OF ALL RESPONSES TO PHQ9 QUESTIONS 1 & 2: 0
SUM OF ALL RESPONSES TO PHQ QUESTIONS 1-9: 0

## 2022-06-03 ENCOUNTER — TELEPHONE (OUTPATIENT)
Dept: FAMILY MEDICINE CLINIC | Age: 47
End: 2022-06-03

## 2022-06-03 DIAGNOSIS — M25.512 ACUTE PAIN OF LEFT SHOULDER: Primary | ICD-10-CM

## 2022-06-08 ASSESSMENT — ENCOUNTER SYMPTOMS
COUGH: 0
RHINORRHEA: 0
VOMITING: 0
SHORTNESS OF BREATH: 0
NAUSEA: 0
WHEEZING: 0
SORE THROAT: 0
DIARRHEA: 0
ABDOMINAL PAIN: 0
BACK PAIN: 0

## 2022-06-29 ENCOUNTER — OFFICE VISIT (OUTPATIENT)
Dept: ORTHOPEDIC SURGERY | Age: 47
End: 2022-06-29
Payer: COMMERCIAL

## 2022-06-29 VITALS
TEMPERATURE: 97.4 F | HEIGHT: 71 IN | HEART RATE: 74 BPM | BODY MASS INDEX: 25.62 KG/M2 | OXYGEN SATURATION: 100 % | WEIGHT: 183 LBS

## 2022-06-29 DIAGNOSIS — M19.012 ARTHRITIS OF LEFT ACROMIOCLAVICULAR JOINT: Primary | ICD-10-CM

## 2022-06-29 DIAGNOSIS — M77.11 RIGHT LATERAL EPICONDYLITIS: ICD-10-CM

## 2022-06-29 PROCEDURE — 99203 OFFICE O/P NEW LOW 30 MIN: CPT | Performed by: ORTHOPAEDIC SURGERY

## 2022-06-29 PROCEDURE — 1036F TOBACCO NON-USER: CPT | Performed by: ORTHOPAEDIC SURGERY

## 2022-06-29 PROCEDURE — G8419 CALC BMI OUT NRM PARAM NOF/U: HCPCS | Performed by: ORTHOPAEDIC SURGERY

## 2022-06-29 PROCEDURE — G8427 DOCREV CUR MEDS BY ELIG CLIN: HCPCS | Performed by: ORTHOPAEDIC SURGERY

## 2022-06-29 NOTE — PROGRESS NOTES
Packs/day: 0.50     Years: 8.00     Pack years: 4.00     Types: Cigarettes     Start date: 1993     Quit date: 2001     Years since quittin.5    Smokeless tobacco: Never Used   Vaping Use    Vaping Use: Never used   Substance and Sexual Activity    Alcohol use: Yes     Comment: Drinks beer occassionally.  Drug use: No    Sexual activity: Not on file   Other Topics Concern    Not on file   Social History Narrative    Not on file     Social Determinants of Health     Financial Resource Strain: Low Risk     Difficulty of Paying Living Expenses: Not hard at all   Food Insecurity: No Food Insecurity    Worried About Running Out of Food in the Last Year: Never true    Artem of Food in the Last Year: Never true   Transportation Needs: No Transportation Needs    Lack of Transportation (Medical): No    Lack of Transportation (Non-Medical):  No   Physical Activity:     Days of Exercise per Week: Not on file    Minutes of Exercise per Session: Not on file   Stress:     Feeling of Stress : Not on file   Social Connections:     Frequency of Communication with Friends and Family: Not on file    Frequency of Social Gatherings with Friends and Family: Not on file    Attends Taoism Services: Not on file    Active Member of [address] [place] [address] [employer] or Organizations: Not on file    Attends Club or Organization Meetings: Not on file    Marital Status: Not on file   Intimate Partner Violence:     Fear of Current or Ex-Partner: Not on file    Emotionally Abused: Not on file    Physically Abused: Not on file    Sexually Abused: Not on file   Housing Stability:     Unable to Pay for Housing in the Last Year: Not on file    Number of [place] in the Last Year: Not on file    Unstable Housing in the Last Year: Not on file     Family History   Problem Relation Age of Onset    Heart Disease Father     Heart Disease Paternal Grandfather     No Known Problems Sister     No Known Problems Sister     Colon Cancer Neg Hx      No Known Allergies  No current outpatient medications on file prior to visit. No current facility-administered medications on file prior to visit. Review of Systems  No fever chills night sweats. Objective:   Pulse 74   Temp 97.4 °F (36.3 °C) (Temporal)   Ht 5' 11\" (1.803 m)   Wt 183 lb (83 kg)   SpO2 100%   BMI 25.52 kg/m²     ORTHOEXAM    Examination of the left AC joint demonstrates a prominent AC joint with pain and discomfort in the [facility] joint to palpation and crossarm testing there is no evidence of injury or trauma therefore this is secondary to osteoarthritis of the [facility] joint. The rest the shoulder examination is completely under percent normal.  Examination of the right arm demonstrates tenderness against finger and wrist extension at the lateral epicondyle and tenderness directly on the origin of the extensors. He has little pain in the mobile wad as well. Assessment:       Diagnosis Orders   1. Arthritis of left acromioclavicular joint     2. Right lateral epicondylitis           Plan:   Regards to lateral epicondylitis I discussed with her that entails. I discussed options with him at this time he is using as he is can use a brace he is can use anti-inflammatories activity modification in addition a stretching program that he learned online. Should it continue to bother him he will come back in for an injection. Regards the [facility] joint where he has arthritis of discussed with him what entails. Activity modification is really the only thing he can do. He does not get better he wants to get an injection we can give an injection of the [facility] joint but if he returns to significant push-ups he will just wear out and return. Down the line he will be a candidate for [facility] joint resection which is really removing the last centimeter of the lateral clavicle. I discussed that surgery with him briefly today what it entails.   At this time in regards to the shoulder we will follow-up urine for potential injection as needed. He is comfortable with the plan. Follow-up will be as needed in both regards. No orders of the defined types were placed in this encounter. No orders of the defined types were placed in this encounter. No follow-ups on file.       Jacoby Campos MD

## 2023-10-25 ENCOUNTER — HOSPITAL ENCOUNTER (OUTPATIENT)
Dept: LAB | Age: 48
Discharge: HOME | End: 2023-10-25
Payer: COMMERCIAL

## 2023-10-25 ENCOUNTER — OFFICE VISIT (OUTPATIENT)
Dept: FAMILY MEDICINE CLINIC | Age: 48
End: 2023-10-25
Payer: COMMERCIAL

## 2023-10-25 VITALS
TEMPERATURE: 97.6 F | SYSTOLIC BLOOD PRESSURE: 132 MMHG | BODY MASS INDEX: 24.92 KG/M2 | WEIGHT: 178 LBS | HEART RATE: 79 BPM | HEIGHT: 71 IN | OXYGEN SATURATION: 99 % | DIASTOLIC BLOOD PRESSURE: 72 MMHG

## 2023-10-25 DIAGNOSIS — R39.12 WEAK URINARY STREAM: ICD-10-CM

## 2023-10-25 DIAGNOSIS — R39.9 UTI SYMPTOMS: ICD-10-CM

## 2023-10-25 DIAGNOSIS — Z12.5 ENCOUNTER FOR PROSTATE CANCER SCREENING: Primary | ICD-10-CM

## 2023-10-25 LAB
BILIRUBIN, POC: NORMAL
BLOOD URINE, POC: NORMAL
CLARITY, POC: CLEAR
COLOR, POC: NORMAL
GLUCOSE URINE, POC: NORMAL
KETONES, POC: NORMAL
LEUKOCYTE EST, POC: NORMAL
NITRITE, POC: NORMAL
PH, POC: 6
PROTEIN, POC: NORMAL
PSA SERPL-MCNC: 0.9 NG/ML (ref 0–4)
SPECIFIC GRAVITY, POC: 1.02
UROBILINOGEN, POC: NORMAL

## 2023-10-25 PROCEDURE — 1036F TOBACCO NON-USER: CPT | Performed by: NURSE PRACTITIONER

## 2023-10-25 PROCEDURE — 36415 COLL VENOUS BLD VENIPUNCTURE: CPT

## 2023-10-25 PROCEDURE — 99203 OFFICE O/P NEW LOW 30 MIN: CPT | Performed by: NURSE PRACTITIONER

## 2023-10-25 PROCEDURE — 84153 ASSAY OF PSA TOTAL: CPT

## 2023-10-25 PROCEDURE — G8420 CALC BMI NORM PARAMETERS: HCPCS | Performed by: NURSE PRACTITIONER

## 2023-10-25 PROCEDURE — G0102 PROSTATE CA SCREENING; DRE: HCPCS | Performed by: NURSE PRACTITIONER

## 2023-10-25 PROCEDURE — G8427 DOCREV CUR MEDS BY ELIG CLIN: HCPCS | Performed by: NURSE PRACTITIONER

## 2023-10-25 PROCEDURE — G8484 FLU IMMUNIZE NO ADMIN: HCPCS | Performed by: NURSE PRACTITIONER

## 2023-10-25 PROCEDURE — 81003 URINALYSIS AUTO W/O SCOPE: CPT | Performed by: NURSE PRACTITIONER

## 2023-10-25 RX ORDER — SULFAMETHOXAZOLE AND TRIMETHOPRIM 800; 160 MG/1; MG/1
1 TABLET ORAL 2 TIMES DAILY
Qty: 14 TABLET | Refills: 0 | Status: SHIPPED | OUTPATIENT
Start: 2023-10-25 | End: 2023-11-01

## 2023-10-25 SDOH — ECONOMIC STABILITY: FOOD INSECURITY: WITHIN THE PAST 12 MONTHS, THE FOOD YOU BOUGHT JUST DIDN'T LAST AND YOU DIDN'T HAVE MONEY TO GET MORE.: NEVER TRUE

## 2023-10-25 SDOH — ECONOMIC STABILITY: HOUSING INSECURITY
IN THE LAST 12 MONTHS, WAS THERE A TIME WHEN YOU DID NOT HAVE A STEADY PLACE TO SLEEP OR SLEPT IN A SHELTER (INCLUDING NOW)?: NO

## 2023-10-25 SDOH — ECONOMIC STABILITY: FOOD INSECURITY: WITHIN THE PAST 12 MONTHS, YOU WORRIED THAT YOUR FOOD WOULD RUN OUT BEFORE YOU GOT MONEY TO BUY MORE.: NEVER TRUE

## 2023-10-25 SDOH — ECONOMIC STABILITY: INCOME INSECURITY: HOW HARD IS IT FOR YOU TO PAY FOR THE VERY BASICS LIKE FOOD, HOUSING, MEDICAL CARE, AND HEATING?: NOT HARD AT ALL

## 2023-10-25 ASSESSMENT — PATIENT HEALTH QUESTIONNAIRE - PHQ9
SUM OF ALL RESPONSES TO PHQ QUESTIONS 1-9: 0
SUM OF ALL RESPONSES TO PHQ QUESTIONS 1-9: 0
8. MOVING OR SPEAKING SO SLOWLY THAT OTHER PEOPLE COULD HAVE NOTICED. OR THE OPPOSITE, BEING SO FIGETY OR RESTLESS THAT YOU HAVE BEEN MOVING AROUND A LOT MORE THAN USUAL: 0
4. FEELING TIRED OR HAVING LITTLE ENERGY: 0
SUM OF ALL RESPONSES TO PHQ QUESTIONS 1-9: 0
SUM OF ALL RESPONSES TO PHQ9 QUESTIONS 1 & 2: 0
10. IF YOU CHECKED OFF ANY PROBLEMS, HOW DIFFICULT HAVE THESE PROBLEMS MADE IT FOR YOU TO DO YOUR WORK, TAKE CARE OF THINGS AT HOME, OR GET ALONG WITH OTHER PEOPLE: 0
7. TROUBLE CONCENTRATING ON THINGS, SUCH AS READING THE NEWSPAPER OR WATCHING TELEVISION: 0
9. THOUGHTS THAT YOU WOULD BE BETTER OFF DEAD, OR OF HURTING YOURSELF: 0
1. LITTLE INTEREST OR PLEASURE IN DOING THINGS: 0
2. FEELING DOWN, DEPRESSED OR HOPELESS: 0
5. POOR APPETITE OR OVEREATING: 0
SUM OF ALL RESPONSES TO PHQ QUESTIONS 1-9: 0
6. FEELING BAD ABOUT YOURSELF - OR THAT YOU ARE A FAILURE OR HAVE LET YOURSELF OR YOUR FAMILY DOWN: 0
3. TROUBLE FALLING OR STAYING ASLEEP: 0

## 2023-10-25 ASSESSMENT — ENCOUNTER SYMPTOMS
DIARRHEA: 0
VOMITING: 0
COUGH: 0
SHORTNESS OF BREATH: 0
NAUSEA: 0
WHEEZING: 0
ABDOMINAL PAIN: 1
BACK PAIN: 0

## 2023-10-25 NOTE — PROGRESS NOTES
S2 normal.   Pulmonary:      Effort: Pulmonary effort is normal. No respiratory distress. Breath sounds: Normal air entry. Abdominal:      General: Abdomen is flat. Bowel sounds are normal.      Palpations: Abdomen is soft. Tenderness: There is abdominal tenderness in the suprapubic area. There is no right CVA tenderness or left CVA tenderness. Musculoskeletal:      Lumbar back: Normal. No tenderness. Skin:     General: Skin is warm and dry. Neurological:      Mental Status: He is alert and oriented to person, place, and time. Psychiatric:         Mood and Affect: Mood normal.         Behavior: Behavior is cooperative. An electronic signature was used to authenticate this note.     --HUSSAIN Yao

## 2023-10-27 LAB — BACTERIA UR CULT: NORMAL

## 2023-11-01 NOTE — PROGRESS NOTES
[address] [facility] [address] [facility]  [id number]  Dept: [phone]  Dept Fax: [phone]  Loc: Russ [facility] [address] [facility] 51 y.o. male presents 6/1/22 with   Chief Complaint   Patient presents with    Cyst     on left shoulder. Worsens when moving, noticed originally a few days ago. Did not injure it to his knowledge. HPI     Patient presents for shoulder problem. Has a bump to left shoulder. Patient unsure if it is getting bigger. Started hurting him a couple days ago. Not red or warm. Hurts more with movement. Does exercise a lot. Reviewed the following history:    No past medical history on file. Past Surgical History:   Procedure Laterality Date    COLONOSCOPY  2017    Dr Zulema Schrader, had polyps    COLONOSCOPY N/A 4/11/2022    COLORECTAL CANCER SCREENING, w/polypectomy performed by Ginger Valiente MD at [facility] Bilateral 12/8/2017    BILATERAL  HERNIA INGUINAL REPAIR performed by Pavithra Mcdermott MD at [address] [facility] [place] SCRN NOT  [facility] N/A 9/18/2017    COLONOSCOPY performed by Ginger Valiente MD at [address] [facility] N/A 12/8/2017    REPAIR OF VENTRAL HERNIA performed by Pavithra Mcdermott MD at [address] [facility]       Family History   Problem Relation Age of Onset    Heart Disease Father     Heart Disease Paternal Grandfather     No Known Problems Sister     No Known Problems Sister     Colon Cancer Neg Hx        No Known Allergies    No current outpatient medications on file prior to visit. No current facility-administered medications on file prior to visit. Review of Systems   Constitutional: Negative for chills and fever. HENT: Negative for congestion, rhinorrhea and sore throat. Respiratory: Negative for cough, shortness of breath and wheezing. Cardiovascular: Negative for chest pain. Gastrointestinal: Negative for abdominal pain, diarrhea, nausea and vomiting. Musculoskeletal: Negative for back pain and myalgias. Left shoulder pain    Skin: Negative for rash. Objective    Vitals:    06/01/22 1342   BP: 112/70   Site: Right Upper Arm   Position: Sitting   Cuff Size: Large Adult   Pulse: 80   Temp: 97.3 °F (36.3 °C)   TempSrc: Infrared   SpO2: 98%   Weight: 183 lb (83 kg)   Height: 5' 11\" (1.803 m)       Physical Exam  Constitutional:       General: He is not in acute distress. Appearance: Normal appearance. He is not ill-appearing or toxic-appearing. HENT:      Head: Normocephalic and atraumatic. Right Ear: Hearing and external ear normal.      Left Ear: Hearing and external ear normal.   Eyes:      General: Lids are normal.      Conjunctiva/sclera: Conjunctivae normal.   Cardiovascular:      Rate and Rhythm: Normal rate and regular rhythm. Heart sounds: Normal heart sounds. Pulmonary:      Effort: Pulmonary effort is normal. No respiratory distress. Breath sounds: Normal breath sounds. No decreased breath sounds, wheezing, rhonchi or rales. Musculoskeletal:      Left shoulder: Tenderness present. Arms:       Cervical back: Normal range of motion and neck supple. Skin:     General: Skin is warm and dry. Neurological:      General: No focal deficit present. Mental Status: He is alert and oriented to person, place, and time. Psychiatric:         Attention and Perception: Attention normal.         Mood and Affect: Mood normal.         Speech: Speech normal.         Behavior: Behavior normal.         Thought Content: Thought content normal.         Cognition and Memory: Cognition normal.         Judgment: Judgment normal.       POC Testing Today: No results found for this visit on 06/01/22. Assessment and Plan    Lona Sam 51 y.o. male presenting shoulder problem     1.  Pain and swelling of left shoulder  - Discussed possible ortho referral  - XR SHOULDER LEFT (MIN 2 VIEWS); Future      Procedures:  Unless otherwise noted below, none    Return if symptoms worsen or fail to improve, for follow up. Side effects and adverse effects of any medication prescribed today, as well as treatment plan/rationale, follow-up care, and result expectations have been discussed with the patient. Expresses understanding and desires to proceed with treatment plan. The patient was reminded that if an antibiotic has been prescribed the predicted course is improvement to cure with no persistent issues. Take antibiotics as directed. If any problems occur, an appointment should be made or ER visit if severe. Because of the risk with ANY antibiotic of C. Difficile colitis if persistent diarrhea or abdominal pain or any concerning symptoms, we should be notified. To reduce this risk, a probiotic pill, yogurt or other preparations containing active cultures should be ingested daily -particularly while on the antibiotic. If any persistent symptoms of illness, follow up appointment should be made in a timely fashion with a physician. Discussed signs and symptoms which require immediate follow-up in ED/call to 911. Understanding verbalized. I have reviewed and updated the electronic medical record.     Eliel Ramirez, APRN - CNP 01-Nov-2023 11:15

## 2023-11-15 PROBLEM — F41.9 ANXIETY: Status: ACTIVE | Noted: 2023-11-15

## 2023-11-15 RX ORDER — CICLOPIROX OLAMINE 7.7 MG/G
CREAM TOPICAL
COMMUNITY
Start: 2021-01-25 | End: 2023-11-17 | Stop reason: SDUPTHER

## 2023-11-15 NOTE — PROGRESS NOTES
"Subjective   Patient ID: Lizett Garner is a 47 y.o. male who presents for Annual Exam.  HPI    Patient presents today for a physical. Does not need form filled out. is fasting for blood work. Tries to eat a generally healthy diet. Exercises, yes.    Patient would like to talk about the pain in his right hand. Ongoing for a while. Admits that he is aware of what the pain is. Would like to know where he can go to have this looked at.     Patient would like to talk about pain in the left side of  his neck. Would like to have this looked at.     Declines flu vaccine     Taking current medications which were reviewed.  Problem list discussed.    Overall doing well.  Eating okay.  Staying active.    Has no other new problem /question.    ROS  Constitutional- No activity change. No appetite change.  Eyes- Denies vision changes.  Respiratory- No shortness of breath.  Cardiovascular- No palpitations. No chest pain.  GI- No nausea or vomiting. No diarrhea or constipation. Denies abdominal pain.  Musculoskeletal- Denies joint swelling.  Extremities- No edema.  Neurological- Denies headaches. Denies dizziness.  Skin- No rashes.  Psychiatric/Behavioral- Denies significant anxiety, or depressed mood.     Objective     /80   Pulse 75   Temp 36.7 °C (98 °F) (Temporal)   Resp 18   Ht 1.803 m (5' 11\")   Wt 79.4 kg (175 lb)   SpO2 98%   BMI 24.41 kg/m²     No Known Allergies    Constitutional-- Well-nourished.  No distress  Head- unremarkable.  Eyes- PERRL.  Conjunctiva normal.  Nose- Normal.  No rhinorrhea noted.  Throat- Oropharynx is clear and moist.  Neck- Supple with no thyromegaly.  No significant cervical adenopathy noted.  Pulmonary/Chest- Breath sounds normal with normal effort.  No wheezing.  Heart- Regular rate and rhythm.  No murmur.  Abdomen- Soft and non-tender.  No masses noted.  Musculoskeletal- Normal ROM.  Mild hand pain with range of motion consistent with tendinitis versus mild early arthritis.  " Versus mild early arthritis.   Neurological- Alert.  No noted deficits.  Skin- Warm.  No rashes.  Psychiatric/Behavioral- Mood and affect normal.  Behavior normal.     Assessment/Plan   1. Routine general medical examination at a health care facility  CBC and Auto Differential    Comprehensive metabolic panel    Lipid panel      2. Anxiety  CBC and Auto Differential    Comprehensive metabolic panel      3. Rash  ciclopirox (Loprox) 0.77 % cream      4. Tooth infection  amoxicillin (Amoxil) 875 mg tablet      5. Tendonitis  methylPREDNISolone (Medrol Dospak) 4 mg tablets      6. Pain of right hand  methylPREDNISolone (Medrol Dospak) 4 mg tablets             Long talk. Treatment options reviewed.  Medrol Dosepak to see if it does not make a huge difference in his hand pain and discomfort  Reviewed most recent labs with patient.  Advised patient to remain up to date on routine screening and immunizations.      Counseled the patient on anxiety.     Educated on tendonitis.  Continue to monitor symptoms.      Complete blood work after fasting for 10 hours.      Continue and take your medications as prescribed.    Health Maintenance issues discussed.    Importance of healthy diet and regular exercise regimen discussed.    We will contact you with any test results ordered. If you do not hear from us, please contact.    Follow-up as instructed or sooner if any problems or symptoms do not resolve as expected.      Scribe Attestation  By signing my name below, IJanell Scribe   attest that this documentation has been prepared under the direction and in the presence of Hans Dumont MD.

## 2023-11-17 ENCOUNTER — LAB (OUTPATIENT)
Dept: LAB | Facility: LAB | Age: 48
End: 2023-11-17
Payer: COMMERCIAL

## 2023-11-17 ENCOUNTER — OFFICE VISIT (OUTPATIENT)
Dept: PRIMARY CARE | Facility: CLINIC | Age: 48
End: 2023-11-17
Payer: COMMERCIAL

## 2023-11-17 VITALS
HEART RATE: 75 BPM | TEMPERATURE: 98 F | DIASTOLIC BLOOD PRESSURE: 80 MMHG | OXYGEN SATURATION: 98 % | SYSTOLIC BLOOD PRESSURE: 120 MMHG | HEIGHT: 71 IN | WEIGHT: 175 LBS | BODY MASS INDEX: 24.5 KG/M2 | RESPIRATION RATE: 18 BRPM

## 2023-11-17 DIAGNOSIS — F41.9 ANXIETY: ICD-10-CM

## 2023-11-17 DIAGNOSIS — Z00.00 ROUTINE GENERAL MEDICAL EXAMINATION AT A HEALTH CARE FACILITY: ICD-10-CM

## 2023-11-17 DIAGNOSIS — R21 RASH: ICD-10-CM

## 2023-11-17 DIAGNOSIS — K04.7 TOOTH INFECTION: ICD-10-CM

## 2023-11-17 DIAGNOSIS — M79.641 PAIN OF RIGHT HAND: ICD-10-CM

## 2023-11-17 DIAGNOSIS — Z00.00 ROUTINE GENERAL MEDICAL EXAMINATION AT A HEALTH CARE FACILITY: Primary | ICD-10-CM

## 2023-11-17 DIAGNOSIS — M77.9 TENDONITIS: ICD-10-CM

## 2023-11-17 LAB
ALBUMIN SERPL BCP-MCNC: 4.5 G/DL (ref 3.4–5)
ALP SERPL-CCNC: 47 U/L (ref 33–120)
ALT SERPL W P-5'-P-CCNC: 19 U/L (ref 10–52)
ANION GAP SERPL CALC-SCNC: 13 MMOL/L (ref 10–20)
AST SERPL W P-5'-P-CCNC: 20 U/L (ref 9–39)
BASOPHILS # BLD AUTO: 0.07 X10*3/UL (ref 0–0.1)
BASOPHILS NFR BLD AUTO: 1.1 %
BILIRUB SERPL-MCNC: 0.7 MG/DL (ref 0–1.2)
BUN SERPL-MCNC: 20 MG/DL (ref 6–23)
CALCIUM SERPL-MCNC: 9.4 MG/DL (ref 8.6–10.3)
CHLORIDE SERPL-SCNC: 102 MMOL/L (ref 98–107)
CHOLEST SERPL-MCNC: 244 MG/DL (ref 0–199)
CHOLESTEROL/HDL RATIO: 3.6
CO2 SERPL-SCNC: 29 MMOL/L (ref 21–32)
CREAT SERPL-MCNC: 1.12 MG/DL (ref 0.5–1.3)
EOSINOPHIL # BLD AUTO: 0.16 X10*3/UL (ref 0–0.7)
EOSINOPHIL NFR BLD AUTO: 2.6 %
ERYTHROCYTE [DISTWIDTH] IN BLOOD BY AUTOMATED COUNT: 12.2 % (ref 11.5–14.5)
GFR SERPL CREATININE-BSD FRML MDRD: 81 ML/MIN/1.73M*2
GLUCOSE SERPL-MCNC: 76 MG/DL (ref 74–99)
HCT VFR BLD AUTO: 45.6 % (ref 41–52)
HDLC SERPL-MCNC: 68.1 MG/DL
HGB BLD-MCNC: 15.6 G/DL (ref 13.5–17.5)
IMM GRANULOCYTES # BLD AUTO: 0.06 X10*3/UL (ref 0–0.7)
IMM GRANULOCYTES NFR BLD AUTO: 1 % (ref 0–0.9)
LDLC SERPL CALC-MCNC: 163 MG/DL
LYMPHOCYTES # BLD AUTO: 2.27 X10*3/UL (ref 1.2–4.8)
LYMPHOCYTES NFR BLD AUTO: 36.6 %
MCH RBC QN AUTO: 30.9 PG (ref 26–34)
MCHC RBC AUTO-ENTMCNC: 34.2 G/DL (ref 32–36)
MCV RBC AUTO: 90 FL (ref 80–100)
MONOCYTES # BLD AUTO: 0.61 X10*3/UL (ref 0.1–1)
MONOCYTES NFR BLD AUTO: 9.8 %
NEUTROPHILS # BLD AUTO: 3.04 X10*3/UL (ref 1.2–7.7)
NEUTROPHILS NFR BLD AUTO: 48.9 %
NON HDL CHOLESTEROL: 176 MG/DL (ref 0–149)
NRBC BLD-RTO: 0 /100 WBCS (ref 0–0)
PLATELET # BLD AUTO: 254 X10*3/UL (ref 150–450)
POTASSIUM SERPL-SCNC: 4.4 MMOL/L (ref 3.5–5.3)
PROT SERPL-MCNC: 7.3 G/DL (ref 6.4–8.2)
RBC # BLD AUTO: 5.05 X10*6/UL (ref 4.5–5.9)
SODIUM SERPL-SCNC: 140 MMOL/L (ref 136–145)
TRIGL SERPL-MCNC: 66 MG/DL (ref 0–149)
VLDL: 13 MG/DL (ref 0–40)
WBC # BLD AUTO: 6.2 X10*3/UL (ref 4.4–11.3)

## 2023-11-17 PROCEDURE — 85025 COMPLETE CBC W/AUTO DIFF WBC: CPT

## 2023-11-17 PROCEDURE — 36415 COLL VENOUS BLD VENIPUNCTURE: CPT

## 2023-11-17 PROCEDURE — 80061 LIPID PANEL: CPT

## 2023-11-17 PROCEDURE — 80053 COMPREHEN METABOLIC PANEL: CPT

## 2023-11-17 PROCEDURE — 1036F TOBACCO NON-USER: CPT | Performed by: FAMILY MEDICINE

## 2023-11-17 PROCEDURE — 99396 PREV VISIT EST AGE 40-64: CPT | Performed by: FAMILY MEDICINE

## 2023-11-17 RX ORDER — METHYLPREDNISOLONE 4 MG/1
TABLET ORAL
Qty: 21 TABLET | Refills: 1 | Status: SHIPPED | OUTPATIENT
Start: 2023-11-17

## 2023-11-17 RX ORDER — CICLOPIROX OLAMINE 7.7 MG/G
CREAM TOPICAL
Qty: 15 G | Refills: 0 | Status: SHIPPED | OUTPATIENT
Start: 2023-11-17

## 2023-11-17 RX ORDER — AMOXICILLIN 875 MG/1
875 TABLET, FILM COATED ORAL 2 TIMES DAILY
Qty: 20 TABLET | Refills: 0 | Status: SHIPPED | OUTPATIENT
Start: 2023-11-17 | End: 2023-11-27

## 2023-11-20 ENCOUNTER — TELEPHONE (OUTPATIENT)
Dept: PRIMARY CARE | Facility: CLINIC | Age: 48
End: 2023-11-20
Payer: COMMERCIAL

## 2023-11-20 DIAGNOSIS — E78.5 HYPERLIPIDEMIA, UNSPECIFIED HYPERLIPIDEMIA TYPE: ICD-10-CM

## 2023-11-20 RX ORDER — ROSUVASTATIN CALCIUM 5 MG/1
5 TABLET, COATED ORAL DAILY
Qty: 30 TABLET | Refills: 3 | Status: SHIPPED | OUTPATIENT
Start: 2023-11-20 | End: 2024-05-18

## 2023-11-20 NOTE — TELEPHONE ENCOUNTER
----- Message from Hans Dumont MD sent at 11/18/2023  7:53 AM EST -----  Labs are within normal limits or stable except for significantly elevated cholesterol.  LDL is greater than 160 which is high.  Recommend starting low-dose statin Crestor 5 mg daily 30 pills with 5 refills.  Repeat lipid profile in 5 to 6 weeks after starting medication.  Please let her know

## 2023-11-20 NOTE — TELEPHONE ENCOUNTER
Pt aware. Pt wanted to inform PCP that he is going to  medication but has decided to try lifestyle modifications for a month, obtain lipid panel and then depending on those values he will begin treatment with Crestor.

## 2024-01-16 ENCOUNTER — TELEPHONE (OUTPATIENT)
Dept: PRIMARY CARE | Facility: CLINIC | Age: 49
End: 2024-01-16
Payer: COMMERCIAL

## 2024-01-16 DIAGNOSIS — M79.601 PAIN OF RIGHT UPPER EXTREMITY: ICD-10-CM

## 2024-01-16 DIAGNOSIS — M79.641 PAIN OF RIGHT HAND: ICD-10-CM

## 2024-01-16 DIAGNOSIS — M25.521 PAIN OF BOTH ELBOWS: Primary | ICD-10-CM

## 2024-01-16 DIAGNOSIS — M25.522 PAIN OF BOTH ELBOWS: Primary | ICD-10-CM

## 2024-01-16 NOTE — TELEPHONE ENCOUNTER
Patient called the office stating the patient was seen in Nov 2023. The patient was seen for right arm pain. Patient was prescribed a steroid at that time. He states the pain has not improved. Patient is requesting a referral for orthopedics. Please advise if okay for referral or if you'd like to see him again  1st.

## 2024-01-18 NOTE — TELEPHONE ENCOUNTER
PATIENT RETURNED CALL - ITS MORE IN THE ELBOW AREA - RIGHT ONE IS WORSE.  REFERRAL IS ALREADY IN SYSTEM AND WILL CALL TO SCHEDULE HIS APPT - WAS GIVEN [facility]BER [phone].

## 2024-01-22 ENCOUNTER — OFFICE VISIT (OUTPATIENT)
Dept: ORTHOPEDIC SURGERY | Facility: CLINIC | Age: 49
End: 2024-01-22
Payer: COMMERCIAL

## 2024-01-22 ENCOUNTER — HOSPITAL ENCOUNTER (OUTPATIENT)
Dept: RADIOLOGY | Facility: CLINIC | Age: 49
Discharge: HOME | End: 2024-01-22
Payer: COMMERCIAL

## 2024-01-22 DIAGNOSIS — M25.521 BILATERAL ELBOW JOINT PAIN: ICD-10-CM

## 2024-01-22 DIAGNOSIS — M25.521 PAIN OF BOTH ELBOWS: ICD-10-CM

## 2024-01-22 DIAGNOSIS — M77.12 LATERAL EPICONDYLITIS OF BOTH ELBOWS: ICD-10-CM

## 2024-01-22 DIAGNOSIS — M25.522 BILATERAL ELBOW JOINT PAIN: ICD-10-CM

## 2024-01-22 DIAGNOSIS — M70.21 OLECRANON BURSITIS OF RIGHT ELBOW: Primary | ICD-10-CM

## 2024-01-22 DIAGNOSIS — M77.11 LATERAL EPICONDYLITIS OF BOTH ELBOWS: ICD-10-CM

## 2024-01-22 DIAGNOSIS — M25.522 PAIN OF BOTH ELBOWS: ICD-10-CM

## 2024-01-22 PROCEDURE — 73080 X-RAY EXAM OF ELBOW: CPT | Mod: 50

## 2024-01-22 PROCEDURE — 1036F TOBACCO NON-USER: CPT | Performed by: ORTHOPAEDIC SURGERY

## 2024-01-22 PROCEDURE — 73080 X-RAY EXAM OF ELBOW: CPT | Mod: BILATERAL PROCEDURE | Performed by: ORTHOPAEDIC SURGERY

## 2024-01-22 PROCEDURE — 99203 OFFICE O/P NEW LOW 30 MIN: CPT | Performed by: ORTHOPAEDIC SURGERY

## 2024-01-22 PROCEDURE — 99213 OFFICE O/P EST LOW 20 MIN: CPT | Performed by: ORTHOPAEDIC SURGERY

## 2024-01-22 PROCEDURE — A4467 BELT STRAP SLEEV GRMNT COVER: HCPCS | Performed by: ORTHOPAEDIC SURGERY

## 2024-01-22 RX ORDER — MELOXICAM 15 MG/1
15 TABLET ORAL DAILY
Qty: 30 TABLET | Refills: 0 | Status: SHIPPED | OUTPATIENT
Start: 2024-01-22 | End: 2024-02-21

## 2024-01-22 NOTE — PROGRESS NOTES
History: Lizett is here for both elbows.  The right bothers him more than the left.  He does do a lot of weightlifting and other activities which seem to irritate the arms.  It hurts both on the inside and outside.  He also notes significant tenderness directly over the tip of the elbow when he rested on chairs.  He gets occasional numbness and tingling in the fourth and fifth fingers on the right side as well.    Past medical history: Multiple  Medications: Multiple  Allergies: No known drug allergies    Please refer to the intake H&P regarding the patient's review of systems, family history and social history as was done today    HEENT: Normal  Lungs: Clear to auscultation  Heart: RRR  Abdomen: Soft, nontender  Skin: clear  Extremity: He is very sensitive to touch both medially and laterally.  There is some pain with wrist extension maneuvers.  He also is very hypersensitive directly over the tip of the olecranon on the right.  He has full motion with flexion-extension supination and pronation.  Good  strength.  Positive Tinel's on the right but no numbness or tingling otherwise.  Contralateral exam is normal for strength, motion, stability and neurovascular assessment.    Radiographs: X-rays of both elbows are essentially negative.    Assessment: Bilateral elbow tendinitis both medially and laterally, right ulnar nerve irritation, right elbow olecranon bursitis    Plan: He has multiple complaints in multiple areas of both elbows.  He is very sensitive over the olecranon tip which is more of a bursitis.  He also has some diffuse tendinitis both medially and laterally in the elbow is from his lifting activities.  He needs to back down from the exercise for the time being and is willing to get into some formal therapy for tendinitis protocol.  He is also willing to try some daily Mobic.  He will stop if it causes any stomach upset.  Will see him back over the next 6 to 8 weeks if not improved to consider  imaging or other modalities if needed.  He understands the need to avoid direct pressure on the elbow for the bursitis portion as well.  All questions were answered today with the patient.    This note was generated with voice recognition software and may contain grammatical errors.

## 2024-03-01 ENCOUNTER — TELEPHONE (OUTPATIENT)
Dept: PRIMARY CARE | Facility: CLINIC | Age: 49
End: 2024-03-01
Payer: COMMERCIAL

## 2024-03-01 NOTE — TELEPHONE ENCOUNTER
Pt calling, he is currently a patient of yours. He wanted to know if his son could become a New Patient with you? Ok to schedule his son?    Please advise

## 2024-03-20 ENCOUNTER — APPOINTMENT (OUTPATIENT)
Dept: ORTHOPEDIC SURGERY | Facility: CLINIC | Age: 49
End: 2024-03-20
Payer: COMMERCIAL

## 2024-05-21 ENCOUNTER — TELEPHONE (OUTPATIENT)
Dept: PRIMARY CARE | Facility: CLINIC | Age: 49
End: 2024-05-21
Payer: COMMERCIAL

## 2024-05-21 DIAGNOSIS — J01.90 ACUTE NON-RECURRENT SINUSITIS, UNSPECIFIED LOCATION: Primary | ICD-10-CM

## 2024-05-21 RX ORDER — AZITHROMYCIN 250 MG/1
TABLET, FILM COATED ORAL DAILY
Qty: 6 TABLET | Refills: 0 | Status: SHIPPED | OUTPATIENT
Start: 2024-05-21 | End: 2024-05-25

## 2024-05-21 NOTE — TELEPHONE ENCOUNTER
Patient is calling because he is currently on vacation in [place] and has come down with a sinus infection. Has had symptoms for 3 days of sinus pain, drainage and stuffy nose. No fever or cough. He's aware that you can't call anything down here and that usually you can't call anything in for someone without them having an appointment first. Does he need to do a virtual visit? ( If so, you don't have any openings today, open something up?)  Otherwise, can you send in something to his [place] [facility] here and then they can transfer it to a pharmacy down there in [place]. They don't have any urgent cares or doctors offices where he is in [place]  DOL visit with you 11/17/23    Please advise  Thanks    Patient's # [phone]    Preferred pharmacy [place] [facility]

## 2024-09-09 ENCOUNTER — OFFICE VISIT (OUTPATIENT)
Dept: FAMILY MEDICINE CLINIC | Age: 49
End: 2024-09-09
Payer: COMMERCIAL

## 2024-09-09 VITALS
HEIGHT: 71 IN | DIASTOLIC BLOOD PRESSURE: 86 MMHG | WEIGHT: 187 LBS | BODY MASS INDEX: 26.18 KG/M2 | TEMPERATURE: 97.1 F | HEART RATE: 78 BPM | OXYGEN SATURATION: 99 % | SYSTOLIC BLOOD PRESSURE: 134 MMHG

## 2024-09-09 DIAGNOSIS — M54.41 ACUTE MIDLINE LOW BACK PAIN WITH RIGHT-SIDED SCIATICA: Primary | ICD-10-CM

## 2024-09-09 PROCEDURE — 99213 OFFICE O/P EST LOW 20 MIN: CPT | Performed by: NURSE PRACTITIONER

## 2024-09-09 PROCEDURE — G8427 DOCREV CUR MEDS BY ELIG CLIN: HCPCS | Performed by: NURSE PRACTITIONER

## 2024-09-09 PROCEDURE — G8419 CALC BMI OUT NRM PARAM NOF/U: HCPCS | Performed by: NURSE PRACTITIONER

## 2024-09-09 PROCEDURE — 1036F TOBACCO NON-USER: CPT | Performed by: NURSE PRACTITIONER

## 2024-09-09 RX ORDER — PREDNISONE 50 MG/1
50 TABLET ORAL DAILY
Qty: 6 TABLET | Refills: 0 | Status: SHIPPED | OUTPATIENT
Start: 2024-09-09 | End: 2024-09-15

## 2024-09-09 RX ORDER — ROSUVASTATIN CALCIUM 5 MG/1
5 TABLET, COATED ORAL DAILY
COMMUNITY
Start: 2023-11-20

## 2024-09-09 ASSESSMENT — PATIENT HEALTH QUESTIONNAIRE - PHQ9: DEPRESSION UNABLE TO ASSESS: URGENT/EMERGENT SITUATION

## 2024-09-09 ASSESSMENT — ENCOUNTER SYMPTOMS
TROUBLE SWALLOWING: 0
DIARRHEA: 0
NAUSEA: 0
RHINORRHEA: 0
SHORTNESS OF BREATH: 0
BACK PAIN: 1
COUGH: 0
WHEEZING: 0
SORE THROAT: 0
VOMITING: 0

## 2024-09-20 SDOH — HEALTH STABILITY: PHYSICAL HEALTH: ON AVERAGE, HOW MANY MINUTES DO YOU ENGAGE IN EXERCISE AT THIS LEVEL?: 60 MIN

## 2024-09-20 SDOH — HEALTH STABILITY: PHYSICAL HEALTH: ON AVERAGE, HOW MANY DAYS PER WEEK DO YOU ENGAGE IN MODERATE TO STRENUOUS EXERCISE (LIKE A BRISK WALK)?: 7 DAYS

## 2024-09-23 ENCOUNTER — OFFICE VISIT (OUTPATIENT)
Dept: ORTHOPEDIC SURGERY | Age: 49
End: 2024-09-23
Payer: COMMERCIAL

## 2024-09-23 ENCOUNTER — HOSPITAL ENCOUNTER (OUTPATIENT)
Dept: ORTHOPEDIC SURGERY | Age: 49
Discharge: HOME | End: 2024-09-25
Payer: COMMERCIAL

## 2024-09-23 VITALS — WEIGHT: 180 LBS | OXYGEN SATURATION: 99 % | BODY MASS INDEX: 25.2 KG/M2 | HEIGHT: 71 IN | HEART RATE: 89 BPM

## 2024-09-23 DIAGNOSIS — M54.9 BACK PAIN, UNSPECIFIED BACK LOCATION, UNSPECIFIED BACK PAIN LATERALITY, UNSPECIFIED CHRONICITY: Primary | ICD-10-CM

## 2024-09-23 DIAGNOSIS — M54.9 BACK PAIN, UNSPECIFIED BACK LOCATION, UNSPECIFIED BACK PAIN LATERALITY, UNSPECIFIED CHRONICITY: ICD-10-CM

## 2024-09-23 PROCEDURE — G8419 CALC BMI OUT NRM PARAM NOF/U: HCPCS | Performed by: ORTHOPAEDIC SURGERY

## 2024-09-23 PROCEDURE — G8427 DOCREV CUR MEDS BY ELIG CLIN: HCPCS | Performed by: ORTHOPAEDIC SURGERY

## 2024-09-23 PROCEDURE — 72110 X-RAY EXAM L-2 SPINE 4/>VWS: CPT | Performed by: ORTHOPAEDIC SURGERY

## 2024-09-23 PROCEDURE — 72110 X-RAY EXAM L-2 SPINE 4/>VWS: CPT

## 2024-09-23 PROCEDURE — 99203 OFFICE O/P NEW LOW 30 MIN: CPT | Performed by: ORTHOPAEDIC SURGERY

## 2024-09-23 PROCEDURE — 1036F TOBACCO NON-USER: CPT | Performed by: ORTHOPAEDIC SURGERY

## 2024-09-30 ENCOUNTER — HOSPITAL ENCOUNTER (OUTPATIENT)
Dept: MRI IMAGING | Age: 49
Discharge: HOME | End: 2024-10-02
Attending: ORTHOPAEDIC SURGERY
Payer: COMMERCIAL

## 2024-09-30 DIAGNOSIS — M54.9 BACK PAIN, UNSPECIFIED BACK LOCATION, UNSPECIFIED BACK PAIN LATERALITY, UNSPECIFIED CHRONICITY: ICD-10-CM

## 2024-09-30 PROCEDURE — 72148 MRI LUMBAR SPINE W/O DYE: CPT

## 2024-10-01 ENCOUNTER — OFFICE VISIT (OUTPATIENT)
Dept: ORTHOPEDIC SURGERY | Age: 49
End: 2024-10-01
Payer: COMMERCIAL

## 2024-10-01 DIAGNOSIS — M54.50 LOW BACK PAIN, UNSPECIFIED BACK PAIN LATERALITY, UNSPECIFIED CHRONICITY, UNSPECIFIED WHETHER SCIATICA PRESENT: Primary | ICD-10-CM

## 2024-10-01 PROCEDURE — 99214 OFFICE O/P EST MOD 30 MIN: CPT | Performed by: ORTHOPAEDIC SURGERY

## 2024-10-01 PROCEDURE — G8419 CALC BMI OUT NRM PARAM NOF/U: HCPCS | Performed by: ORTHOPAEDIC SURGERY

## 2024-10-01 PROCEDURE — G8484 FLU IMMUNIZE NO ADMIN: HCPCS | Performed by: ORTHOPAEDIC SURGERY

## 2024-10-01 PROCEDURE — G8427 DOCREV CUR MEDS BY ELIG CLIN: HCPCS | Performed by: ORTHOPAEDIC SURGERY

## 2024-10-01 PROCEDURE — 1036F TOBACCO NON-USER: CPT | Performed by: ORTHOPAEDIC SURGERY

## 2024-10-01 NOTE — PROGRESS NOTES
Subjective:      Patient ID: Krystyna Shankar is a 48 y.o. male who presents today for:  Chief Complaint   Patient presents with    Follow-up     Patient presents to clinic for MRI results of lumbar spine       Subjective/Objective/Assessment/Plan:     SUBJECTIVE -patient comes in with low back pain and right-sided radicular complaints down the back of his thigh to the side of his calf.  This began on Labor Day.  He has been on steroids.    OBJECTIVE -The patient can rise up on their toes and rise up on her heels.  5 out of 5 hip flexion and knee extension strength bilaterally.  Sensation intact bilaterally in the lower extremities from L2-S1.  Positive straight leg raise right.    MRI LUMBAR SPINE WO CONTRAST  Narrative: EXAMINATION:  MRI OF THE LUMBAR SPINE WITHOUT CONTRAST, 9/30/2024 10:02 am    TECHNIQUE:  Multiplanar multisequence MRI of the lumbar spine was performed without the  administration of intravenous contrast.    COMPARISON:  None.    HISTORY:  ORDERING SYSTEM PROVIDED HISTORY: Back pain, unspecified back location,  unspecified back pain laterality, unspecified chronicity  TECHNOLOGIST PROVIDED HISTORY:  What reading provider will be dictating this exam?->CRC    FINDINGS:  BONES/ALIGNMENT: There is normal alignment of the spine. The vertebral body  heights are maintained. The bone marrow signal appears unremarkable.    SPINAL CORD: The conus terminates normally.    SOFT TISSUES: No paraspinal mass identified.    L1-L2: There is no significant disc herniation, spinal canal stenosis or  neural foraminal narrowing.    L2-L3: There is no significant disc herniation, spinal canal stenosis or  neural foraminal narrowing.    L3-L4: There is no significant disc herniation, spinal canal stenosis or  neural foraminal narrowing.    L4-L5: Mild posterior disc bulge.  There is no significant disc herniation,  spinal canal stenosis or neural foraminal narrowing.    L5-S1: Moderate right paracentral disc osteophyte

## 2024-10-04 ENCOUNTER — OFFICE VISIT (OUTPATIENT)
Dept: ORTHOPEDIC SURGERY | Facility: CLINIC | Age: 49
End: 2024-10-04
Payer: COMMERCIAL

## 2024-10-04 DIAGNOSIS — M54.16 LUMBAR RADICULOPATHY: Primary | ICD-10-CM

## 2024-10-04 PROCEDURE — 99215 OFFICE O/P EST HI 40 MIN: CPT | Performed by: ORTHOPAEDIC SURGERY

## 2024-10-04 NOTE — PROGRESS NOTES
Lizett Garner is a 48 y.o. male who presents for low back pain/MRI review.    HPI:  48-year-old gentleman here for new patient evaluation of low back pain and MRI results.  He denies any fever chills nausea vomiting night sweats.  He has no bowel or bladder complaints.    Physical exam:  Well-nourished, well kept.No lymphangitis or lymphadenopathy in the examined extremities.  Gait normal.  Can stand on heels and toes.   Examination of the back shows tenderness in the paraspinous musculature, mostly in the right upper buttock area.  There is mildly decreased range of motion in all directions due to guarding/muscle spasms and pain at extremes.  There is good strength and no instability.  Examination of the lower extremities reveals no point tenderness, swelling, or deformity.  Range of motion of the hips, knees, and ankles are full without crepitance, instability, or exacerbation of pain.  Strength is 5/5 throughout.  No redness, abrasions, or lesions on extremities  Gross sensation intact in the extremities.  Deep tendon reflexes 1+ bilateral. Clonus negative.  Affect normal.  Alert and oriented ×3.  Coordination normal.    Imaging studies:  X-rays of the lumbar spine from September 23, 2024 from [facility] were reviewed.  An MRI of the lumbar spine from [facility] on October 1, 2024 was reviewed.    Assessment:  48-year-old gentleman here for new patient evaluation of low back pain and MRI results.  Imaging studies done at [facility].  He has about a 1 month history of right upper buttock pain with radiating pain down the lateral right thigh and hamstring down into the lateral calf stopping about the ankle.  No left-sided involvement.  No significant back pain other than the upper buttock.  He was golfing on Labor Day weekend and really overdid it, he started having the buttock and leg pain shortly after that.  Sitting and driving is what makes it worse.  Standing seems to make it better.  He saw Dr. Garza at  [facility], who ordered an MRI and found that he had a herniated disc centrally and right at L4-5.  Since Dr. Garza was not booking surgeries out until at least 6 weeks she offered him to go to pain management and get an epidural injection, the patient is trying to set that up but has not done that yet.  He is here to discuss surgical options and get second opinion from Dr. Mariscal.    We have reviewed test today, x-rays, MRI.  His wife is with him today acting as a helpful and necessary historian.  We reviewed the notes from Dr. Garza from October 1, 2024.  We did consider and discuss surgery today.    For complete plan and/or surgical details, please refer to Dr. Mariscal's portion of this split dictation.    -Waldemar Edge PA-C    In a face-to-face encounter, I performed a history and physical examination, discussed pertinent diagnostic studies if indicated, and discussed diagnosis and management strategies with both the patient and the midlevel provider.  I reviewed the midlevel's note and agree with the documented findings and plan of care.    Patient with severe right leg radiculopathy for the past month.  It is not getting any better.  He tried chiropractic and failed that.  He has no left-sided symptoms.  He has some back pain.  There is no fevers chills nausea vomiting.  There is no bowel or bladder changes.  Activity makes his pain worse.  Rest makes it better.  He has x-rays and MRI that I reviewed at [facility].  He has a right-sided herniated disc contacting and displacing the S1 nerve root on the right.  On exam he has weakness with plantarflexion on the right versus the left.  His pain is in the S1 distribution on the right.  He is severely inhibited with bodily function.  He can barely ride in the car.  He has difficulty walking.  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    I had a long discussion with the patient and explained to them that their options are 1) live with the symptoms and see  how they evolve, 2) physical therapy, 3) pain management or 4) surgery.    All of the risks, benefits, and potential complications for operative and nonoperative treatment were discussed at length with the patient.  The patient understands that surgery is elective.  The patient understands that I do not have to do surgery.  The patient understands that surgery comes with more risks than not doing surgery.  Risks of operative intervention include, but are not limited to: Anesthesia complications, excessive blood loss, infection, damaged to uninjured structures including, but not limited to, the dural sac and nerve roots, blood clots, and lack of improvement or worsening of symptoms.  These complications could result in death, permanent disability, or need for reoperation.  The patient completely understood those risks and wished to proceed with operative intervention.    We are going to perform an L5-S1 right microdiscectomy with a globus retractor coming in from the right side.    Primitivo Mariscal MD  Orthopedic surgery

## 2024-10-07 ENCOUNTER — TELEPHONE (OUTPATIENT)
Dept: PRIMARY CARE | Facility: CLINIC | Age: 49
End: 2024-10-07
Payer: COMMERCIAL

## 2024-10-07 DIAGNOSIS — B35.9 TINEA: Primary | ICD-10-CM

## 2024-10-07 RX ORDER — FLUCONAZOLE 150 MG/1
150 TABLET ORAL ONCE
Qty: 1 TABLET | Refills: 1 | Status: SHIPPED | OUTPATIENT
Start: 2024-10-07 | End: 2024-10-07

## 2024-10-07 NOTE — TELEPHONE ENCOUNTER
SOFIA  Doctor Tim,  Would it be possible to have Fluconazole Tablets called instead of the lotion for the rash I get on back ?      Thanks you  Lizett Garner

## 2024-10-07 NOTE — TELEPHONE ENCOUNTER
Hans Dumont MD  [id number] [place] [id number] Clinical Support Staff2 minutes ago (12:34 PM)       E prescribed med to their pharmacy.  Call patient and inform.

## 2024-10-08 PROBLEM — M51.27 OTHER INTERVERTEBRAL DISC DISPLACEMENT, LUMBOSACRAL REGION: Status: ACTIVE | Noted: 2024-11-06

## 2024-10-11 ENCOUNTER — PREP FOR PROCEDURE (OUTPATIENT)
Dept: ORTHOPEDIC SURGERY | Age: 49
End: 2024-10-11

## 2024-10-11 DIAGNOSIS — M51.26 DISPLACEMENT OF LUMBAR INTERVERTEBRAL DISC: ICD-10-CM

## 2024-10-14 ENCOUNTER — TELEPHONE (OUTPATIENT)
Dept: ORTHOPEDIC SURGERY | Age: 49
End: 2024-10-14

## 2024-10-16 ENCOUNTER — APPOINTMENT (OUTPATIENT)
Dept: PRIMARY CARE | Facility: CLINIC | Age: 49
End: 2024-10-16
Payer: COMMERCIAL

## 2024-10-16 VITALS
RESPIRATION RATE: 18 BRPM | HEIGHT: 71 IN | WEIGHT: 187 LBS | DIASTOLIC BLOOD PRESSURE: 90 MMHG | BODY MASS INDEX: 26.18 KG/M2 | SYSTOLIC BLOOD PRESSURE: 140 MMHG

## 2024-10-16 DIAGNOSIS — M51.27 OTHER INTERVERTEBRAL DISC DISPLACEMENT, LUMBOSACRAL REGION: Primary | ICD-10-CM

## 2024-10-16 DIAGNOSIS — B35.9 TINEA: ICD-10-CM

## 2024-10-16 DIAGNOSIS — E78.5 HYPERLIPIDEMIA, UNSPECIFIED HYPERLIPIDEMIA TYPE: ICD-10-CM

## 2024-10-16 DIAGNOSIS — Z01.818 PREOP EXAMINATION: ICD-10-CM

## 2024-10-16 PROCEDURE — 99213 OFFICE O/P EST LOW 20 MIN: CPT | Performed by: FAMILY MEDICINE

## 2024-10-16 PROCEDURE — 3008F BODY MASS INDEX DOCD: CPT | Performed by: FAMILY MEDICINE

## 2024-10-16 RX ORDER — FLUCONAZOLE 150 MG/1
150 TABLET ORAL ONCE
Qty: 1 TABLET | Refills: 2 | Status: SHIPPED | OUTPATIENT
Start: 2024-10-16 | End: 2024-10-16

## 2024-10-16 NOTE — H&P (VIEW-ONLY)
"Subjective   Patient ID: Lizett Garner is a 48 y.o. male who presents for Pre-op Exam.  HPI    Patient presents in the office for a pre op evaluation.  patient is having a L5-S1 RIGHT MICRODISCECTOMY   [place] [facility] [place].    Patient denies having any history of any allergic reactions to anesthesia.  Patient is not currently under the care of a Cardiologist     Preop scheduled for 10/23/24    Taking current medications which were reviewed.  Problem list discussed.    Overall doing well.  Eating okay.  Staying active.    Has no other new problem /question.     ROS  Constitutional- No activity change. No appetite change.  Eyes- Denies vision changes.  Respiratory- No shortness of breath.  Cardiovascular- No palpitations. No chest pain.  GI- No nausea or vomiting. No diarrhea or constipation. Denies abdominal pain.  Musculoskeletal- Denies joint swelling.  Extremities- No edema.  Neurological- Denies headaches. Denies dizziness.  Skin- No rashes.  Psychiatric/Behavioral- Denies significant anxiety, or depressed mood.     Objective     /90   Resp 18   Ht 1.803 m (5' 11\")   Wt 84.8 kg (187 lb)   BMI 26.08 kg/m²     No Known Allergies    Constitutional-- Well-nourished.  No distress  Head- unremarkable.  Eyes- PERRL.  Conjunctiva normal.  Nose- Normal.  No rhinorrhea noted.  Throat- Oropharynx is clear and moist.  Neck- Supple with no thyromegaly.  No significant cervical adenopathy noted.  Pulmonary/Chest- Breath sounds normal with normal effort.  No wheezing.  Heart- Regular rate and rhythm.  No murmur.  Abdomen- Soft and non-tender.  No masses noted.  Musculoskeletal-low back pain exacerbated with range of motion.  Extremities- No edema.   Neurological- Alert.  No noted deficits.  Skin- Warm.  No rashes.  Psychiatric/Behavioral- Mood and affect normal.  Behavior normal.     Assessment/Plan   1. Other intervertebral disc displacement, lumbosacral region        2. Preop examination      "   3. Hyperlipidemia, unspecified hyperlipidemia type  Lipid Panel      4. Tinea  fluconazole (Diflucan) 150 mg tablet           Medically clear as low risk for surgery.    Long talk. Treatment options reviewed.    Continue and take your medications as prescribed.    Health Maintenance issues discussed.    Importance of healthy diet and regular exercise regimen discussed.    We will contact you with any test results ordered. If you do not hear from us, please contact.    Follow-up as instructed or sooner if any problems or symptoms do not resolve as expected.

## 2024-10-16 NOTE — PROGRESS NOTES
"Subjective   Patient ID: Lizett Garner is a 48 y.o. male who presents for Pre-op Exam.  HPI    Patient presents in the office for a pre op evaluation.  patient is having a L5-S1 RIGHT MICRODISCECTOMY   On 11/6/24  At [facility] [place]  [place]. Mariscal.    Patient denies having any history of any allergic reactions to anesthesia.  Patient is not currently under the care of a Cardiologist     Preop scheduled for 10/23/24    Taking current medications which were reviewed.  Problem list discussed.    Overall doing well.  Eating okay.  Staying active.    Has no other new problem /question.     ROS  Constitutional- No activity change. No appetite change.  Eyes- Denies vision changes.  Respiratory- No shortness of breath.  Cardiovascular- No palpitations. No chest pain.  GI- No nausea or vomiting. No diarrhea or constipation. Denies abdominal pain.  Musculoskeletal- Denies joint swelling.  Extremities- No edema.  Neurological- Denies headaches. Denies dizziness.  Skin- No rashes.  Psychiatric/Behavioral- Denies significant anxiety, or depressed mood.     Objective     /90   Resp 18   Ht 1.803 m (5' 11\")   Wt 84.8 kg (187 lb)   BMI 26.08 kg/m²     No Known Allergies    Constitutional-- Well-nourished.  No distress  Head- unremarkable.  Eyes- PERRL.  Conjunctiva normal.  Nose- Normal.  No rhinorrhea noted.  Throat- Oropharynx is clear and moist.  Neck- Supple with no thyromegaly.  No significant cervical adenopathy noted.  Pulmonary/Chest- Breath sounds normal with normal effort.  No wheezing.  Heart- Regular rate and rhythm.  No murmur.  Abdomen- Soft and non-tender.  No masses noted.  Musculoskeletal-low back pain exacerbated with range of motion.  Extremities- No edema.   Neurological- Alert.  No noted deficits.  Skin- Warm.  No rashes.  Psychiatric/Behavioral- Mood and affect normal.  Behavior normal.     Assessment/Plan   1. Other intervertebral disc displacement, lumbosacral region        2. Preop examination      "   3. Hyperlipidemia, unspecified hyperlipidemia type  Lipid Panel      4. Tinea  fluconazole (Diflucan) 150 mg tablet           Medically clear as low risk for surgery.    Long talk. Treatment options reviewed.    Continue and take your medications as prescribed.    Health Maintenance issues discussed.    Importance of healthy diet and regular exercise regimen discussed.    We will contact you with any test results ordered. If you do not hear from us, please contact.    Follow-up as instructed or sooner if any problems or symptoms do not resolve as expected.

## 2024-10-23 ENCOUNTER — PRE-ADMISSION TESTING (OUTPATIENT)
Dept: PREADMISSION TESTING | Facility: HOSPITAL | Age: 49
End: 2024-10-23
Payer: COMMERCIAL

## 2024-10-23 ENCOUNTER — HOSPITAL ENCOUNTER (OUTPATIENT)
Dept: CARDIOLOGY | Facility: HOSPITAL | Age: 49
Discharge: HOME | End: 2024-10-23
Payer: COMMERCIAL

## 2024-10-23 VITALS
RESPIRATION RATE: 17 BRPM | HEIGHT: 71 IN | SYSTOLIC BLOOD PRESSURE: 128 MMHG | OXYGEN SATURATION: 98 % | DIASTOLIC BLOOD PRESSURE: 78 MMHG | BODY MASS INDEX: 23.52 KG/M2 | HEART RATE: 81 BPM | WEIGHT: 168 LBS

## 2024-10-23 DIAGNOSIS — M51.27 OTHER INTERVERTEBRAL DISC DISPLACEMENT, LUMBOSACRAL REGION: ICD-10-CM

## 2024-10-23 DIAGNOSIS — Z01.818 PRE-OP EXAMINATION: ICD-10-CM

## 2024-10-23 LAB
ALBUMIN SERPL BCP-MCNC: 5 G/DL (ref 3.4–5)
ALP SERPL-CCNC: 42 U/L (ref 33–120)
ALT SERPL W P-5'-P-CCNC: 22 U/L (ref 10–52)
ANION GAP SERPL CALC-SCNC: 11 MMOL/L (ref 10–20)
APTT PPP: 30 SECONDS (ref 27–38)
AST SERPL W P-5'-P-CCNC: 21 U/L (ref 9–39)
BASOPHILS # BLD AUTO: 0.06 X10*3/UL (ref 0–0.1)
BASOPHILS NFR BLD AUTO: 0.7 %
BILIRUB SERPL-MCNC: 0.8 MG/DL (ref 0–1.2)
BUN SERPL-MCNC: 15 MG/DL (ref 6–23)
CALCIUM SERPL-MCNC: 10.1 MG/DL (ref 8.6–10.3)
CHLORIDE SERPL-SCNC: 103 MMOL/L (ref 98–107)
CO2 SERPL-SCNC: 30 MMOL/L (ref 21–32)
CREAT SERPL-MCNC: 1.11 MG/DL (ref 0.5–1.3)
EGFRCR SERPLBLD CKD-EPI 2021: 81 ML/MIN/1.73M*2
EOSINOPHIL # BLD AUTO: 0.23 X10*3/UL (ref 0–0.7)
EOSINOPHIL NFR BLD AUTO: 2.9 %
ERYTHROCYTE [DISTWIDTH] IN BLOOD BY AUTOMATED COUNT: 12.2 % (ref 11.5–14.5)
EST. AVERAGE GLUCOSE BLD GHB EST-MCNC: 88 MG/DL
GLUCOSE SERPL-MCNC: 87 MG/DL (ref 74–99)
HBA1C MFR BLD: 4.7 %
HCT VFR BLD AUTO: 44.1 % (ref 41–52)
HGB BLD-MCNC: 15.2 G/DL (ref 13.5–17.5)
IMM GRANULOCYTES # BLD AUTO: 0.05 X10*3/UL (ref 0–0.7)
IMM GRANULOCYTES NFR BLD AUTO: 0.6 % (ref 0–0.9)
INR PPP: 1 (ref 0.9–1.1)
LYMPHOCYTES # BLD AUTO: 2.61 X10*3/UL (ref 1.2–4.8)
LYMPHOCYTES NFR BLD AUTO: 32.5 %
MCH RBC QN AUTO: 31 PG (ref 26–34)
MCHC RBC AUTO-ENTMCNC: 34.5 G/DL (ref 32–36)
MCV RBC AUTO: 90 FL (ref 80–100)
MONOCYTES # BLD AUTO: 0.69 X10*3/UL (ref 0.1–1)
MONOCYTES NFR BLD AUTO: 8.6 %
NEUTROPHILS # BLD AUTO: 4.4 X10*3/UL (ref 1.2–7.7)
NEUTROPHILS NFR BLD AUTO: 54.7 %
NRBC BLD-RTO: 0 /100 WBCS (ref 0–0)
PLATELET # BLD AUTO: 240 X10*3/UL (ref 150–450)
POTASSIUM SERPL-SCNC: 4.7 MMOL/L (ref 3.5–5.3)
PROT SERPL-MCNC: 7 G/DL (ref 6.4–8.2)
PROTHROMBIN TIME: 11.7 SECONDS (ref 9.8–12.8)
RBC # BLD AUTO: 4.91 X10*6/UL (ref 4.5–5.9)
SODIUM SERPL-SCNC: 139 MMOL/L (ref 136–145)
WBC # BLD AUTO: 8 X10*3/UL (ref 4.4–11.3)

## 2024-10-23 PROCEDURE — 85730 THROMBOPLASTIN TIME PARTIAL: CPT

## 2024-10-23 PROCEDURE — 80053 COMPREHEN METABOLIC PANEL: CPT

## 2024-10-23 PROCEDURE — 93005 ELECTROCARDIOGRAM TRACING: CPT

## 2024-10-23 PROCEDURE — 85025 COMPLETE CBC W/AUTO DIFF WBC: CPT

## 2024-10-23 PROCEDURE — 85610 PROTHROMBIN TIME: CPT

## 2024-10-23 PROCEDURE — 87081 CULTURE SCREEN ONLY: CPT | Mod: ELYLAB

## 2024-10-23 PROCEDURE — 83036 HEMOGLOBIN GLYCOSYLATED A1C: CPT | Mod: ELYLAB

## 2024-10-23 PROCEDURE — 36415 COLL VENOUS BLD VENIPUNCTURE: CPT

## 2024-10-23 PROCEDURE — 93010 ELECTROCARDIOGRAM REPORT: CPT | Performed by: INTERNAL MEDICINE

## 2024-10-23 NOTE — PREPROCEDURE INSTRUCTIONS
JOINT REPLACEMENT AND SPINAL SURGERY PRE-OPERATIVE INSTRUCTIONS     You will receive notification one business day prior to your surgery to confirm your arrival time and any additional information between 2 P.M. - 5 P.M. It is important that you answer your phone and/or check your messages during this time.     You may see in [facility] your surgery start time change several times even up to the day before your procedure. Please disregard those times and only follow the time given by the  who will be notifying you via phone and not a text.     Please arrive at your scheduled time to avoid delay or cancelled surgery.     Please enter the building through either the Main Entrance in front of the hospital or from the Parking Garage Walk Way Bridge. From the parking garage, which is free, take the 2nd Floor Walkway Bridge into the hospital and check in at the [facility] Outpatient Desk as you enter the hospital directly in front of you. If you enter through the Main Entrance take the elevator off the lobby on the right labeled “A” to the 2nd floor and check in at the [facility] Outpatient Surgery desk as you exit the elevator. Wheelchairs are available for use if using the Main Entrance.     Handicap parking in the land lot, in front of hospital by main entrance, wheelchairs are available at this entrance     ?   INSTRUCTIONS:   Please contact your doctor’s office, who is doing procedure, about any changes in your health, bad cold, fever, sore throat, or COVID within last 4 weeks     Talk to your surgeon for instructions if you should stop your aspirin, blood thinner, diabetes medicines, weight loss medications, multivitamins or over the counter supplements since many surgeons have you adjust or stop these medications prior to procedure. The doctor’s office may have you contact the prescribing doctor for medication adjustments for your surgery.     If you take certain medications like Beta Blocker or Anti-Seizure medication, you  may have to take them the morning of procedure with a sip of water. If this is the case your surgeons office should let you know, and the PAT nurses will follow up when they speak to you to verify you are aware.     If not staying overnight after your surgery, and you are receiving any type of anesthesia with your surgery, you must have an adult (age 18 or older) immediately available to drive you home after surgery or your procedure will be cancelled. You may be discharged home after surgery per an Uber, Lyft, Taxi or any other transportation service as long as the responsible adult (18 or older) is in the vehicle with you at time of discharge. The  of these transportation services is not responsible for your care and cannot be consider a responsible adult. We also highly recommend you have someone stay with you for the entire day and night of your surgery.     All jewelry and piercings must be removed. If you are unable to remove an item or have a dermal piercing, please be sure to tell the nurse when you arrive for surgery.     Nail polish must be removed off one finger of each hand     Make-up or other beauty products (lotion, deodorant, hairspray, perfume, etc.) must be removed or not used for day of surgery.     Avoid smoking, consuming alcohol, or any medical or recreational drug use for 24 hours before surgery.     Do not wear contacts to hospital, bring your glasses and a case     Leave valuables at home except photo ID, insurance card and any co-payment that has been requested by hospital.     For adults who are unable to consent or make medical decisions for themselves, a legal guardian or Power of  must accompany them to the surgery center. If this is not possible, please call [phone] to make additional arrangements.  If there is any guardianship or legal Power of  paperwork, please bring them the day of surgery.     Wear comfortable, loose fitting clothing into the procedure.   While your admitted you are asked to bring short sleeve shirts, shorts (loose like pajamas, sweat shorts), and tennis shoes/sneakers.  No slip on shoes.     If you use a CPAP or BIPAP, please make sure the PAT nurse was able to get the settings from you, if not please inform the nurses the day of surgery of your settings you use at home.     Additional instructions about eating and drinking before surgery:     Do not eat any solid foods?or drink anything after midnight the night prior to your procedure. Milk, nutritional drinks/supplements, and infant formula are considered solid foods.  This also includes no gum, candy, lozenges, ice, or any other oral consumption.     CHG SOAP    In regards to bathing, please follow the instructions explained to you at the PAT appointment about taking a showering with the CHG soap the 5 nights prior to your surgery.       During your PAT Appointment you were given [employer]ans CHG Wash Soap (bottles of bubble gum pink soap) to use before procedure.  Begin using the CHG soap 5 days before your scheduled surgery.  Be sure to sleep on clean sheets - change your sheets the first night you do this cleansing process (you don't not need to change them every night).     5 days prior to surgery at night- 4 days prior to surgery  at night- 3 days prior to surgery at night- the Night before Surgery   You will take a shower in the evening before bed.  You will wash and rinse your face, genitals, and hair with normal soap and normal shampoo.     Then apply CHG soap solution to wet washcloth.     Turn the water off or move away from the water spray to avoid premature rinsing of the CHG soap as you apply it.   Apply the CHG soap to entire body from the neck down EXCEPT your face and genitals.  Allow the CHG soap to sit for 3 minutes on your skin.  Then turn on water and rinse the CHG solution off your body completely.    DO NOT wash with regular soap after you have used the CHG soap   Pat yourself  dry with a clean, fresh-laundered towel when you get out of the shower and clean Pj's  DO NOT apply any powders, deodorants, or lotions after shower   Be aware the CHG soap will stain fabrics if you wash them with bleach after use.   Make sure to pay special attention to cleaning area(s) where your incision(s) will be located.   You will repeat this same process steps 1-12: 5 days prior at night- 4 days prior at night- 3 days prior at night- the Night before Surgery     Morning of Surgery CHG Soap- (Process changes slightly)  1. You will take a shower in the evening before bed.  2. You will wash and rinse your face and genitals with normal soap.  3. Then apply CHG soap solution to wet washcloth.     4. Turn the water off or move away from the water spray to avoid premature rinsing of the CHG soap as you apply it.   5. Apply the CHG soap to entire body from the neck down EXCEPT your face and genitals.  6. Wash your hair with CHG soap.  7.Allow the CHG soap to sit for 3 minutes on your skin and in your hair.  Then turn on water and rinse the CHG solution off your body completely.    DO NOT wash with regular soap after you have used the CHG soap   Pat yourself dry with a clean, fresh-laundered towel when you get out of the shower and clean Pj's  DO NOT apply any powders, deodorants, hair products or lotions after shower   Be aware the CHG soap will stain fabrics if you wash them with bleach after use.   Make sure to pay special attention to cleaning area(s) where your incision(s) will be located.   Put clean clothes on to come in for procedure.    ORAL RINSE   You will receive a call or notification from your pharmacy to  a prescription prior to procedure.  Be sure to  the prescription oral rinse from your local pharmacy.   You will be using the oral rinse the night before and the morning of surgery.    Take a cap full of the solution, 15mL     Swish (gargle if you can) the mouthwash in your mouth for at  least 30 seconds, (DO NOT SWALLOW), and spit out     After the oral CHG rinse, do not rinse your mouth with water, drink, or eat.      If you have to take a medication the morning of surgery as instructed by your surgeon- please take that medication with a sip of water prior to doing the oral rinse the day of surgery.       ?If you have any questions or concerns, please call Pre-Admission Testing at [phone] or your Physician’s office Dr. Primitivo Mariscal  [phone]  [place] [facility] General Line: [phone]

## 2024-10-25 LAB — STAPHYLOCOCCUS SPEC CULT: NORMAL

## 2024-10-27 LAB
ATRIAL RATE: 73 BPM
P AXIS: 65 DEGREES
P OFFSET: 193 MS
P ONSET: 147 MS
PR INTERVAL: 154 MS
Q ONSET: 224 MS
QRS COUNT: 12 BEATS
QRS DURATION: 78 MS
QT INTERVAL: 362 MS
QTC CALCULATION(BAZETT): 398 MS
QTC FREDERICIA: 386 MS
R AXIS: 10 DEGREES
T AXIS: 56 DEGREES
T OFFSET: 405 MS
VENTRICULAR RATE: 73 BPM

## 2024-10-28 DIAGNOSIS — Z01.818 PRE-OPERATIVE CLEARANCE: Primary | ICD-10-CM

## 2024-10-28 RX ORDER — CHLORHEXIDINE GLUCONATE ORAL RINSE 1.2 MG/ML
15 SOLUTION DENTAL DAILY
Qty: 30 ML | Refills: 0 | Status: SHIPPED | OUTPATIENT
Start: 2024-10-28 | End: 2024-10-30

## 2024-10-30 ENCOUNTER — OFFICE VISIT (OUTPATIENT)
Dept: ORTHOPEDIC SURGERY | Facility: CLINIC | Age: 49
End: 2024-10-30
Payer: COMMERCIAL

## 2024-10-30 DIAGNOSIS — M54.16 LUMBAR RADICULOPATHY: Primary | ICD-10-CM

## 2024-10-30 PROCEDURE — 99211 OFF/OP EST MAY X REQ PHY/QHP: CPT | Performed by: PHYSICIAN ASSISTANT

## 2024-10-30 PROCEDURE — 1036F TOBACCO NON-USER: CPT | Performed by: PHYSICIAN ASSISTANT

## 2024-10-30 PROCEDURE — 99024 POSTOP FOLLOW-UP VISIT: CPT | Performed by: PHYSICIAN ASSISTANT

## 2024-10-30 PROCEDURE — L0650 LSO SC R ANT/POS PNL PRE OTS: HCPCS | Performed by: PHYSICIAN ASSISTANT

## 2024-10-30 RX ORDER — GABAPENTIN 300 MG/1
300 CAPSULE ORAL 3 TIMES DAILY
Qty: 90 CAPSULE | Refills: 0 | Status: SHIPPED | OUTPATIENT
Start: 2024-10-30 | End: 2024-11-29

## 2024-11-01 ENCOUNTER — TELEPHONE (OUTPATIENT)
Dept: PRIMARY CARE | Facility: CLINIC | Age: 49
End: 2024-11-01
Payer: COMMERCIAL

## 2024-11-04 ENCOUNTER — APPOINTMENT (OUTPATIENT)
Dept: PRIMARY CARE | Facility: CLINIC | Age: 49
End: 2024-11-04
Payer: COMMERCIAL

## 2024-11-05 ENCOUNTER — ANESTHESIA EVENT (OUTPATIENT)
Dept: OPERATING ROOM | Facility: HOSPITAL | Age: 49
End: 2024-11-05
Payer: COMMERCIAL

## 2024-11-05 SDOH — HEALTH STABILITY: MENTAL HEALTH: CURRENT SMOKER: 0

## 2024-11-05 NOTE — ANESTHESIA PREPROCEDURE EVALUATION
Lizett Garner is a 48 y.o. male here for:    L5-S1 RIGHT MICRODISCECTOMY  With Primitivo Mariscal MD  Pre-Op Diagnosis Codes:      * Other intervertebral disc displacement, lumbosacral region [M51.27]    Relevant Problems   Neuro   (+) Anxiety       Lab Results   Component Value Date    HGB 15.2 10/23/2024    HCT 44.1 10/23/2024    WBC 8.0 10/23/2024     10/23/2024     10/23/2024    K 4.7 10/23/2024     10/23/2024    CREATININE 1.11 10/23/2024    BUN 15 10/23/2024       Social History     Tobacco Use   Smoking Status Never   Smokeless Tobacco Never       No Known Allergies    Current Outpatient Medications   Medication Instructions    ciclopirox (Loprox) 0.77 % cream Topical, Daily RT    gabapentin (NEURONTIN) 300 mg, oral, 3 times daily    methylPREDNISolone (Medrol Dospak) 4 mg tablets Take as directed on package.    rosuvastatin (CRESTOR) 5 mg, oral, Daily       Past Surgical History:   Procedure Laterality Date    HERNIA REPAIR  04/26/2019    inguinal hernia bilateral and umbilical       No family history on file.    NPO Details:  No data recorded    Physical Exam    Airway  Mallampati: II  TM distance: >3 FB  Neck ROM: full     Cardiovascular - normal exam     Dental - normal exam     Pulmonary - normal exam     Abdominal            Anesthesia Plan    History of general anesthesia?: yes  History of complications of general anesthesia?: no    ASA 2     general and regional     The patient is not a current smoker.    intravenous induction   Postoperative administration of opioids is intended.  Trial extubation is planned.  Anesthetic plan and risks discussed with patient.    Plan discussed with CRNA.        
99.2

## 2024-11-06 ENCOUNTER — ANESTHESIA (OUTPATIENT)
Dept: OPERATING ROOM | Facility: HOSPITAL | Age: 49
End: 2024-11-06
Payer: COMMERCIAL

## 2024-11-06 ENCOUNTER — HOSPITAL ENCOUNTER (OUTPATIENT)
Facility: HOSPITAL | Age: 49
Setting detail: OUTPATIENT SURGERY
Discharge: HOME | End: 2024-11-06
Attending: ORTHOPAEDIC SURGERY | Admitting: ORTHOPAEDIC SURGERY
Payer: COMMERCIAL

## 2024-11-06 ENCOUNTER — APPOINTMENT (OUTPATIENT)
Dept: RADIOLOGY | Facility: HOSPITAL | Age: 49
End: 2024-11-06
Payer: COMMERCIAL

## 2024-11-06 VITALS
SYSTOLIC BLOOD PRESSURE: 125 MMHG | BODY MASS INDEX: 24.66 KG/M2 | TEMPERATURE: 97.7 F | OXYGEN SATURATION: 99 % | HEIGHT: 71 IN | DIASTOLIC BLOOD PRESSURE: 78 MMHG | RESPIRATION RATE: 19 BRPM | HEART RATE: 88 BPM | WEIGHT: 176.15 LBS

## 2024-11-06 DIAGNOSIS — M51.27 OTHER INTERVERTEBRAL DISC DISPLACEMENT, LUMBOSACRAL REGION: Primary | ICD-10-CM

## 2024-11-06 PROCEDURE — 3700000001 HC GENERAL ANESTHESIA TIME - INITIAL BASE CHARGE: Performed by: ORTHOPAEDIC SURGERY

## 2024-11-06 PROCEDURE — 63030 LAMOT DCMPRN NRV RT 1 LMBR: CPT | Performed by: PHYSICIAN ASSISTANT

## 2024-11-06 PROCEDURE — 2500000004 HC RX 250 GENERAL PHARMACY W/ HCPCS (ALT 636 FOR OP/ED): Performed by: STUDENT IN AN ORGANIZED HEALTH CARE EDUCATION/TRAINING PROGRAM

## 2024-11-06 PROCEDURE — 88304 TISSUE EXAM BY PATHOLOGIST: CPT | Performed by: PATHOLOGY

## 2024-11-06 PROCEDURE — 63030 LAMOT DCMPRN NRV RT 1 LMBR: CPT | Performed by: ORTHOPAEDIC SURGERY

## 2024-11-06 PROCEDURE — 3600000009 HC OR TIME - EACH INCREMENTAL 1 MINUTE - PROCEDURE LEVEL FOUR: Performed by: ORTHOPAEDIC SURGERY

## 2024-11-06 PROCEDURE — 2500000004 HC RX 250 GENERAL PHARMACY W/ HCPCS (ALT 636 FOR OP/ED): Performed by: NURSE ANESTHETIST, CERTIFIED REGISTERED

## 2024-11-06 PROCEDURE — 88304 TISSUE EXAM BY PATHOLOGIST: CPT | Mod: TC,ELYLAB | Performed by: ORTHOPAEDIC SURGERY

## 2024-11-06 PROCEDURE — 7100000009 HC PHASE TWO TIME - INITIAL BASE CHARGE: Performed by: ORTHOPAEDIC SURGERY

## 2024-11-06 PROCEDURE — 2500000005 HC RX 250 GENERAL PHARMACY W/O HCPCS: Performed by: ORTHOPAEDIC SURGERY

## 2024-11-06 PROCEDURE — 2780000003 HC OR 278 NO HCPCS: Performed by: ORTHOPAEDIC SURGERY

## 2024-11-06 PROCEDURE — 2500000004 HC RX 250 GENERAL PHARMACY W/ HCPCS (ALT 636 FOR OP/ED): Performed by: ANESTHESIOLOGY

## 2024-11-06 PROCEDURE — 2500000005 HC RX 250 GENERAL PHARMACY W/O HCPCS: Performed by: PHYSICIAN ASSISTANT

## 2024-11-06 PROCEDURE — 3600000004 HC OR TIME - INITIAL BASE CHARGE - PROCEDURE LEVEL FOUR: Performed by: ORTHOPAEDIC SURGERY

## 2024-11-06 PROCEDURE — 7100000001 HC RECOVERY ROOM TIME - INITIAL BASE CHARGE: Performed by: ORTHOPAEDIC SURGERY

## 2024-11-06 PROCEDURE — 2500000001 HC RX 250 WO HCPCS SELF ADMINISTERED DRUGS (ALT 637 FOR MEDICARE OP): Performed by: STUDENT IN AN ORGANIZED HEALTH CARE EDUCATION/TRAINING PROGRAM

## 2024-11-06 PROCEDURE — 64635 DESTROY LUMB/SAC FACET JNT: CPT | Performed by: ORTHOPAEDIC SURGERY

## 2024-11-06 PROCEDURE — 2720000007 HC OR 272 NO HCPCS: Performed by: ORTHOPAEDIC SURGERY

## 2024-11-06 PROCEDURE — 2500000004 HC RX 250 GENERAL PHARMACY W/ HCPCS (ALT 636 FOR OP/ED): Mod: JZ | Performed by: PHYSICIAN ASSISTANT

## 2024-11-06 PROCEDURE — 7100000002 HC RECOVERY ROOM TIME - EACH INCREMENTAL 1 MINUTE: Performed by: ORTHOPAEDIC SURGERY

## 2024-11-06 PROCEDURE — 7100000010 HC PHASE TWO TIME - EACH INCREMENTAL 1 MINUTE: Performed by: ORTHOPAEDIC SURGERY

## 2024-11-06 PROCEDURE — 2500000001 HC RX 250 WO HCPCS SELF ADMINISTERED DRUGS (ALT 637 FOR MEDICARE OP): Performed by: PHYSICIAN ASSISTANT

## 2024-11-06 PROCEDURE — A9999 DME SUPPLY OR ACCESSORY, NOS: HCPCS | Performed by: ORTHOPAEDIC SURGERY

## 2024-11-06 PROCEDURE — 88311 DECALCIFY TISSUE: CPT | Performed by: PATHOLOGY

## 2024-11-06 PROCEDURE — 3700000002 HC GENERAL ANESTHESIA TIME - EACH INCREMENTAL 1 MINUTE: Performed by: ORTHOPAEDIC SURGERY

## 2024-11-06 PROCEDURE — 2500000002 HC RX 250 W HCPCS SELF ADMINISTERED DRUGS (ALT 637 FOR MEDICARE OP, ALT 636 FOR OP/ED): Performed by: PHYSICIAN ASSISTANT

## 2024-11-06 RX ORDER — FENTANYL CITRATE 50 UG/ML
25 INJECTION, SOLUTION INTRAMUSCULAR; INTRAVENOUS EVERY 5 MIN PRN
Status: DISCONTINUED | OUTPATIENT
Start: 2024-11-06 | End: 2024-11-06 | Stop reason: HOSPADM

## 2024-11-06 RX ORDER — ESMOLOL HYDROCHLORIDE 10 MG/ML
INJECTION INTRAVENOUS AS NEEDED
Status: DISCONTINUED | OUTPATIENT
Start: 2024-11-06 | End: 2024-11-06

## 2024-11-06 RX ORDER — PHENYLEPHRINE HYDROCHLORIDE 10 MG/ML
INJECTION INTRAVENOUS AS NEEDED
Status: DISCONTINUED | OUTPATIENT
Start: 2024-11-06 | End: 2024-11-06

## 2024-11-06 RX ORDER — ACETAMINOPHEN 500 MG
500 TABLET ORAL EVERY 6 HOURS PRN
COMMUNITY
End: 2024-11-06 | Stop reason: HOSPADM

## 2024-11-06 RX ORDER — CEFAZOLIN SODIUM 2 G/100ML
2 INJECTION, SOLUTION INTRAVENOUS ONCE
Status: COMPLETED | OUTPATIENT
Start: 2024-11-06 | End: 2024-11-06

## 2024-11-06 RX ORDER — ACETAMINOPHEN 325 MG/1
650 TABLET ORAL ONCE
Status: COMPLETED | OUTPATIENT
Start: 2024-11-06 | End: 2024-11-06

## 2024-11-06 RX ORDER — FENTANYL CITRATE 50 UG/ML
INJECTION, SOLUTION INTRAMUSCULAR; INTRAVENOUS AS NEEDED
Status: DISCONTINUED | OUTPATIENT
Start: 2024-11-06 | End: 2024-11-06

## 2024-11-06 RX ORDER — PROPOFOL 10 MG/ML
INJECTION, EMULSION INTRAVENOUS AS NEEDED
Status: DISCONTINUED | OUTPATIENT
Start: 2024-11-06 | End: 2024-11-06

## 2024-11-06 RX ORDER — MEPERIDINE HYDROCHLORIDE 25 MG/ML
12.5 INJECTION INTRAMUSCULAR; INTRAVENOUS; SUBCUTANEOUS EVERY 10 MIN PRN
Status: DISCONTINUED | OUTPATIENT
Start: 2024-11-06 | End: 2024-11-06 | Stop reason: HOSPADM

## 2024-11-06 RX ORDER — OXYCODONE AND ACETAMINOPHEN 5; 325 MG/1; MG/1
1 TABLET ORAL EVERY 4 HOURS PRN
Qty: 25 TABLET | Refills: 0 | Status: SHIPPED | OUTPATIENT
Start: 2024-11-06 | End: 2024-11-11

## 2024-11-06 RX ORDER — OXYCODONE HYDROCHLORIDE 5 MG/1
10 TABLET ORAL EVERY 4 HOURS PRN
Status: CANCELLED | OUTPATIENT
Start: 2024-11-06

## 2024-11-06 RX ORDER — PHENYLEPHRINE HCL IN 0.9% NACL 1 MG/10 ML
SYRINGE (ML) INTRAVENOUS AS NEEDED
Status: DISCONTINUED | OUTPATIENT
Start: 2024-11-06 | End: 2024-11-06

## 2024-11-06 RX ORDER — SODIUM CHLORIDE, SODIUM LACTATE, POTASSIUM CHLORIDE, CALCIUM CHLORIDE 600; 310; 30; 20 MG/100ML; MG/100ML; MG/100ML; MG/100ML
100 INJECTION, SOLUTION INTRAVENOUS CONTINUOUS
Status: DISCONTINUED | OUTPATIENT
Start: 2024-11-06 | End: 2024-11-06 | Stop reason: HOSPADM

## 2024-11-06 RX ORDER — ONDANSETRON HYDROCHLORIDE 2 MG/ML
INJECTION, SOLUTION INTRAVENOUS AS NEEDED
Status: DISCONTINUED | OUTPATIENT
Start: 2024-11-06 | End: 2024-11-06

## 2024-11-06 RX ORDER — TRANEXAMIC ACID 650 MG/1
1950 TABLET ORAL ONCE
Status: COMPLETED | OUTPATIENT
Start: 2024-11-06 | End: 2024-11-06

## 2024-11-06 RX ORDER — OXYCODONE HYDROCHLORIDE 5 MG/1
5 TABLET ORAL EVERY 4 HOURS PRN
Status: DISCONTINUED | OUTPATIENT
Start: 2024-11-06 | End: 2024-11-06 | Stop reason: HOSPADM

## 2024-11-06 RX ORDER — SODIUM CHLORIDE 0.9 G/100ML
IRRIGANT IRRIGATION AS NEEDED
Status: DISCONTINUED | OUTPATIENT
Start: 2024-11-06 | End: 2024-11-06 | Stop reason: HOSPADM

## 2024-11-06 RX ORDER — OXYCODONE AND ACETAMINOPHEN 5; 325 MG/1; MG/1
1 TABLET ORAL AS NEEDED
Status: DISCONTINUED | OUTPATIENT
Start: 2024-11-06 | End: 2024-11-06 | Stop reason: HOSPADM

## 2024-11-06 RX ORDER — CELECOXIB 200 MG/1
200 CAPSULE ORAL ONCE
Status: COMPLETED | OUTPATIENT
Start: 2024-11-06 | End: 2024-11-06

## 2024-11-06 RX ORDER — BUPIVACAINE HCL/EPINEPHRINE 0.5-1:200K
VIAL (ML) INJECTION AS NEEDED
Status: DISCONTINUED | OUTPATIENT
Start: 2024-11-06 | End: 2024-11-06 | Stop reason: HOSPADM

## 2024-11-06 RX ORDER — LABETALOL HYDROCHLORIDE 5 MG/ML
5 INJECTION, SOLUTION INTRAVENOUS ONCE AS NEEDED
Status: DISCONTINUED | OUTPATIENT
Start: 2024-11-06 | End: 2024-11-06 | Stop reason: HOSPADM

## 2024-11-06 RX ORDER — ONDANSETRON HYDROCHLORIDE 2 MG/ML
4 INJECTION, SOLUTION INTRAVENOUS ONCE AS NEEDED
Status: DISCONTINUED | OUTPATIENT
Start: 2024-11-06 | End: 2024-11-06 | Stop reason: HOSPADM

## 2024-11-06 RX ORDER — DIPHENHYDRAMINE HYDROCHLORIDE 50 MG/ML
12.5 INJECTION INTRAMUSCULAR; INTRAVENOUS ONCE AS NEEDED
Status: DISCONTINUED | OUTPATIENT
Start: 2024-11-06 | End: 2024-11-06 | Stop reason: HOSPADM

## 2024-11-06 RX ORDER — HYDRALAZINE HYDROCHLORIDE 20 MG/ML
5 INJECTION INTRAMUSCULAR; INTRAVENOUS EVERY 30 MIN PRN
Status: DISCONTINUED | OUTPATIENT
Start: 2024-11-06 | End: 2024-11-06 | Stop reason: HOSPADM

## 2024-11-06 RX ORDER — ROCURONIUM BROMIDE 10 MG/ML
INJECTION, SOLUTION INTRAVENOUS AS NEEDED
Status: DISCONTINUED | OUTPATIENT
Start: 2024-11-06 | End: 2024-11-06

## 2024-11-06 RX ORDER — GLYCOPYRROLATE 0.2 MG/ML
INJECTION INTRAMUSCULAR; INTRAVENOUS AS NEEDED
Status: DISCONTINUED | OUTPATIENT
Start: 2024-11-06 | End: 2024-11-06

## 2024-11-06 RX ORDER — MIDAZOLAM HYDROCHLORIDE 1 MG/ML
INJECTION, SOLUTION INTRAMUSCULAR; INTRAVENOUS AS NEEDED
Status: DISCONTINUED | OUTPATIENT
Start: 2024-11-06 | End: 2024-11-06

## 2024-11-06 RX ORDER — ALBUTEROL SULFATE 0.83 MG/ML
2.5 SOLUTION RESPIRATORY (INHALATION) ONCE AS NEEDED
Status: DISCONTINUED | OUTPATIENT
Start: 2024-11-06 | End: 2024-11-06 | Stop reason: HOSPADM

## 2024-11-06 RX ORDER — LIDOCAINE HYDROCHLORIDE 20 MG/ML
INJECTION, SOLUTION INFILTRATION; PERINEURAL AS NEEDED
Status: DISCONTINUED | OUTPATIENT
Start: 2024-11-06 | End: 2024-11-06

## 2024-11-06 ASSESSMENT — COLUMBIA-SUICIDE SEVERITY RATING SCALE - C-SSRS
1. IN THE PAST MONTH, HAVE YOU WISHED YOU WERE DEAD OR WISHED YOU COULD GO TO SLEEP AND NOT WAKE UP?: NO
2. HAVE YOU ACTUALLY HAD ANY THOUGHTS OF KILLING YOURSELF?: NO
6. HAVE YOU EVER DONE ANYTHING, STARTED TO DO ANYTHING, OR PREPARED TO DO ANYTHING TO END YOUR LIFE?: NO

## 2024-11-06 ASSESSMENT — PAIN - FUNCTIONAL ASSESSMENT
PAIN_FUNCTIONAL_ASSESSMENT: 0-10

## 2024-11-06 ASSESSMENT — PAIN SCALES - GENERAL
PAINLEVEL_OUTOF10: 4
PAINLEVEL_OUTOF10: 3
PAINLEVEL_OUTOF10: 4
PAINLEVEL_OUTOF10: 5 - MODERATE PAIN
PAINLEVEL_OUTOF10: 2
PAINLEVEL_OUTOF10: 0 - NO PAIN
PAINLEVEL_OUTOF10: 4

## 2024-11-06 ASSESSMENT — PAIN DESCRIPTION - DESCRIPTORS
DESCRIPTORS: SORE

## 2024-11-06 NOTE — DISCHARGE INSTRUCTIONS
-No heavy lifting (more than 10 pounds) or straining until patient is seen in the office.  -Okay to remove dressing in 48 hours and get wound wet in the shower.  Do not scrub wound.  Cover wound with dry dressing after shower.  No baths, hot tubs, or pools.  -Encourage ambulation at least 3 times a day.  -No formal physical therapy until ordered by Dr. Mariscal.  -Follow-up in the office in 2 weeks.  Appointment should already be made.  -You must be accompanied by a responsible adult upon discharge and for 24 hours after surgery.  Do not drive a motor vehicle, operate machinery, power tools or appliances, drink alcoholic beverages, or make critical decisions for 24 hours and while on narcotic pain meds.  -Be aware of dizziness, which may cause a fall.  Change positions slowly.  -You may resume your regular diet, but do so slowly.  -Use your pain medication prescription as prescribed by your physician.  If possible, take your medication with food, and drink plenty of fluids.  -Call your surgeon if you have questions, temperature of 101° or greater, increased bleeding swelling or pain, drainage from the incision or increased redness around the incision.  -Call [phone] with any questions or concerns.        General Anesthesia Discharge Instructions    About this topic  You may need general anesthesia if you need to be asleep during a procedure. Your doctor will use drugs to block the signals that go from your nerves to your brain. Doctors give general anesthesia during a surgery or procedure to:  Allow you to sleep  Help your body be still  Relax your muscles  Help you to relax and be pain free  Keep you from remembering the surgery  Let the doctor manage your airway, breathing, and blood flow  The doctor or nurse anesthetist gives general anesthesia by a shot into your vein. Sometimes, you may breathe in a gas through a mask placed over your face.  What care is needed at home?  Ask your doctor what you need to  do when you go home. Make sure you ask questions if you do not understand what the doctor says.  Your doctor may give you drugs to prevent or treat an upset stomach from the anesthetic. Take them as ordered.  If your throat is sore, suck on ice chips or popsicles to ease throat pain.  Put 2 to 3 pillows under your head and back when you lie down to help you breathe easier.  For the first 24 to 48 hours:  Do not operate heavy or dangerous machinery.  Do not make major decisions or sign important papers. You may not be able to think clearly.  Avoid beer, wine, or mixed drinks.  You are at a higher risk of falling for at least 24 hours after general anesthesia.  Take extra care when you get up.  Do not change positions quickly.  Do not rush when you need to go to the bathroom or to answer the phone.  Ask for help if you feel unsteady when you try to walk.  Wear shoes with non-slip soles and low heels.  What follow-up care is needed?  Your doctor may ask you to come back to the office to check on your progress. Be sure to keep these visits.  If you have stitches that do not dissolve or staples, you will need to have them removed. Your doctor will want to do this in 1 to 2 weeks. If the doctor used skin glue, the glue will fall off on its own.  What drugs may be needed?  The doctor may order drugs to:  Help with pain  Treat an upset stomach or throwing up  Will physical activity be limited?  You will not be allowed to drive right away after the procedure. Ask a family member or a friend to drive you home.  Avoid trying to get out of bed without help until you are sure of your balance.  You may have to limit your activity. Talk to your doctor about if you need to limit how much you lift or limit exercise after your procedure.  What changes to diet are needed?  Start with a light diet when you are fully awake. This includes things that are easy to swallow like soups, pudding, jello, toast, and eggs. Slowly progress to your  normal diet.  What problems could happen?  Low blood pressure  Breathing problems  Upset stomach or throwing up  Dizziness  Blood clots  Infection  When do I need to call the doctor?  Trouble breathing  Upset stomach or throwing up more than 3 times in the next 2 days  Dizziness  Teach Back: Helping You Understand  The Teach Back Method helps you understand the information we are giving you. After you talk with the staff, tell them in your own words what you learned. This helps to make sure the staff has described each thing clearly. It also helps to explain things that may have been confusing. Before going home, make sure you can do these:  I can tell you about my procedure.  I can tell you if I need to follow up with my doctor.  I can tell you what is good for me to eat and drink the next day.  I can tell you what I would do if I have trouble breathing, an upset stomach, or dizziness.  Where can I learn more?  [employer] [place] [employer]  https://www.nigms.nih.gov/education/pages/factsheet_Anesthesia.aspx  [facility] Choices  http://www.nhs.uk/conditions/Anaesthetic-general/Pages/Definition.aspx  Last Reviewed Date  2020-04-22

## 2024-11-06 NOTE — ANESTHESIA PROCEDURE NOTES
Airway  Date/Time: 11/6/2024 1:26 PM  Urgency: elective    Airway not difficult    Staffing  Performed: CRNA   Authorized by: Mendoza Marino DO    Performed by: YUE Vanessa-MARISOL  Patient location during procedure: OR    Indications and Patient Condition  Indications for airway management: anesthesia and airway protection  Spontaneous Ventilation: absent  Sedation level: deep  Preoxygenated: yes  Mask difficulty assessment: 1 - vent by mask    Final Airway Details  Final airway type: endotracheal airway      Successful airway: ETT  Cuffed: yes   Successful intubation technique: video laryngoscopy (Inman 4)  Facilitating devices/methods: intubating stylet  Endotracheal tube insertion site: oral  Blade size: #4  ETT size (mm): 8.0  Cormack-Lehane Classification: grade I - full view of glottis  Placement verified by: chest auscultation and capnometry   Measured from: gums  ETT to gums (cm): 22  Number of attempts at approach: 1

## 2024-11-06 NOTE — ANESTHESIA PROCEDURE NOTES
Peripheral Block    Patient location during procedure: holding area  Start time: 11/6/2024 12:50 PM  End time: 11/6/2024 1:00 PM  Reason for block: at surgeon's request and post-op pain management  Staffing  Performed: attending   Authorized by: Mendoza Marino DO    Performed by: Mendoza Marino DO  Preanesthetic Checklist  Completed: patient identified, IV checked, site marked, risks and benefits discussed, surgical consent, monitors and equipment checked, pre-op evaluation and timeout performed   Timeout performed at: 11/6/2024 12:50 PM  Peripheral Block  Patient position: sitting  Prep: ChloraPrep  Patient monitoring: heart rate, continuous pulse ox and cardiac monitor  Block type: SIVAKUMAR  Laterality: right  Injection technique: single-shot  Guidance: ultrasound guided  Local infiltration: lidocaine  Infiltration strength: 1 %  Dose: 3 mL  Needle  Needle type: Tuohy   Needle gauge: 21 G  Needle length: 10 cm  Needle localization: anatomical landmarks and ultrasound guidance  Assessment  Injection assessment: negative aspiration for heme, no paresthesia on injection and incremental injection  Paresthesia pain: none  Heart rate change: no  Slow fractionated injection: yes  Additional Notes  Right sided SIVAKUMAR nerve block performed for post-op analgesia. 2 mg IV versed given for procedural sedation. Sterile prep. 1% lidocaine for skin and subcutaneous topicalization. A total of 20 mL of 0.5% ropivacaine with decadron 5 mg given in divided doses. No pain or paresthesias on injection. Negative aspiration for heme every 5 mL. Patient tolerated the procedure well without complications

## 2024-11-06 NOTE — ANESTHESIA POSTPROCEDURE EVALUATION
Patient: Lizett Garner    Procedure Summary       Date: 11/06/24 Room / Location: [facility] / [facility]    Anesthesia Start: 1317 Anesthesia Stop: 1449    Procedures:       L5-S1 RIGHT MICRODISCECTOMY (Right: Spine Lumbar)      Operative Images Diagnosis:       Other intervertebral disc displacement, lumbosacral region      (Other intervertebral disc displacement, lumbosacral region [M51.27])    Surgeons: Primitivo Mariscal MD Responsible Provider: Mendoza Marino DO    Anesthesia Type: general, regional ASA Status: 2            Anesthesia Type: general, regional    Vitals Value Taken Time   /76 11/06/24 1446   Temp 36.6 11/06/24 1450   Pulse 106 11/06/24 1449   Resp 12 11/06/24 1449   SpO2 98 % 11/06/24 1449   Vitals shown include unfiled device data.    Anesthesia Post Evaluation    Patient location during evaluation: PACU  Patient participation: complete - patient participated  Level of consciousness: awake and alert  Pain management: adequate  Airway patency: patent  Cardiovascular status: acceptable  Respiratory status: acceptable and face mask  Hydration status: acceptable  Postoperative Nausea and Vomiting: none      No notable events documented.

## 2024-11-06 NOTE — OP NOTE
Preoperative diagnosis: L5-S1 right herniated disc. Lumbar spondylosis.    Postop diagnosis: Above    Procedure: 1) L5-S1 right microdiscectomy                       2) thermal ablation of the medial nerve branch supplying the L5-S1 right facet.    Surgeon: Primitivo Mariscal M.D.    Asst.: Adair GILLESPIE  The physician assistant was present through the entire case.  Given the nature of the disease process and the procedure to be performed a skilled surgical assistant was necessary during the case.  The assistant was necessary in order to hold retractors and directly assist in the operation.  A certified scrub tech was at the back table managing instruments and supplies for the surgical case.    Anesthesia: General    Blood Loss: 50 cc    Complications: None    HPI: The patient had leg symptoms from the above-described herniated disc.  The patient failed all nonoperative treatment.  All of the risks, benefits, and potential complications for operative and nonoperative treatment were discussed at length with the patient.  Risks of operative intervention include, but are not limited to: Anesthesia complications, excessive blood loss, infection, damaged to uninjured structures including, but not limited to, the dural sac and nerve roots, blood clots, and lack of improvement or worsening of symptoms.  These complications could result in death, permanent disability, or need for reoperation.  The patient completely understood those risks and wished to proceed with operative intervention.      Operative procedure: The patient was wheeled from the preanesthesia care unit to the theater.  The patient was placed in supine position and all bony prominences were well-padded.  For the entire procedure anesthesia maintainined control of the patient's head neck.  General anesthesia was induced.  The patient was then placed prone on the Alvin table.  All bony prominences were well-padded.  X-rays were then taken to confirm  appropriate visualization of the operative level.  Next, the back was prepped and draped in normal sterile fashion.    Timeout was performed, site verification was verified, and appropriate antibiotic administration was confirmed.  Next, lateral x-ray was taken confirming the appropriate skin incision location.  A longitudinal incision just off the midline to the affected side was performed with a knife through skin.  Bovie was used to dissect out the fat to expose the fascia.  The fascia was incised longitudinally and blunt dissection was taken down to the lamina and facet over the affected level.  Dilators were then used to dilate up to a size 18 mm dilator.  A permanent 22 mm tube was then docked into appropriate position visualized in AP and lateral x-rays.      The microscope scope was then brought in for use throughout the remainder of the case.  A Bovie was used to remove a small amount of muscle overlying the lamina and facet without violating the facet capsule.  A Bovie was used to perform a thermal ablation of the medial nerve branch at the operative level.  Normal anatomic landmarks were used to determine the appropriate location to perform thermal ablation.  A bur was used to thin the lamina preserving more than half the lateral thickness of the facet and pars interarticularis at that level.  The bur was used to take enough bone until the the ligamentum flavum was visualized.  Next a curved curet was used to reach underneath the lamina freeing up the ligamentum flavum.  2mm and 3mm Kerrisons were used to perform a hemilaminectomy taking down the lamina and ligamentum flavum preserving the pars and facet.  At this point the lateral edge of the dural sac as well as the traversing caudal nerve root were visualized.  A nerve root retractor was used to gently retract the traversing nerve root and dural sac towards the midline.  It contained large, pimple shaped, herniation was encountered.  A knife was used  to make a cut in the annulus.  Short ball and straight pituitary were used to remove this fragment in multiple pieces.  Pieces were sent to pathology.  At this point a long walk be swung along the course of the dural sac and the S1 nerve root on the right without any evidence of free disc fragment or impingement.    At this point direct visualization confirmed complete decompression of the dural sac as well as the traversing nerve root.  Both the nerve root and dural sac were pulsating nicely.  Hemostasis was maintained for the entire case using FloSeal.  No FloSeal was left in the patient.  At this point there was no evidence of any bleeding.  Valsalva was performed and there was no evidence of any dural leak or bleeding.  The wound was irrigated with copious amounts of normal saline and suctioned dry.  The tube was removed under direct visualization.    The fascia was closed with a running 0 Vicryl suture.  The fatty layer was closed with 0 Vicryl the skin was closed with 2-0 Vicryl 4-0 Monocryl and Steri-Strips.  A sterile dressing was applied.  At the conclusion of the case the patient's toes were pink and warm with brisk capillary refill.  The patient tolerated the procedure well.          This dictation was created using voice recognition software.  If there are any questions about inaccuracies please do not hesitate to contact me.

## 2024-11-15 LAB
LABORATORY COMMENT REPORT: NORMAL
PATH REPORT.FINAL DX SPEC: NORMAL
PATH REPORT.GROSS SPEC: NORMAL
PATH REPORT.RELEVANT HX SPEC: NORMAL
PATH REPORT.TOTAL CANCER: NORMAL

## 2024-11-19 ENCOUNTER — OFFICE VISIT (OUTPATIENT)
Dept: ORTHOPEDIC SURGERY | Facility: CLINIC | Age: 49
End: 2024-11-19
Payer: COMMERCIAL

## 2024-11-19 DIAGNOSIS — M54.16 LUMBAR RADICULOPATHY: Primary | ICD-10-CM

## 2024-11-19 PROCEDURE — 99211 OFF/OP EST MAY X REQ PHY/QHP: CPT | Performed by: ORTHOPAEDIC SURGERY

## 2024-11-19 NOTE — PROGRESS NOTES
Chief complaint: 2 weeks out L5-S1 right microdiscectomy    HPI: Patient doing well.  He says he is at least 6% better with guards to his leg pain.  His back still achy and sore.  He continues to head in the right direction.  He is moving and functioning in life significantly better.  There is no fevers chills nausea vomiting.  No bowel or bladder changes.    Physical exam: Incisions peripherally well-healed.  No redness warmth swelling puffiness or signs of infection.  He has appropriate back range of motion.  He walks normally has good strength in his legs.    Assessment/plan: Patient doing very well L5-S1 right microdiscectomy.  Slowly his leg is improving.  We will let him wean himself back to normal activities a month from now.  Up until then he will still have BLT restrictions.  We will see him back on a as needed basis if he does not continue to improve or if he has any questions or concerns.

## 2025-02-14 DIAGNOSIS — B35.9 TINEA: ICD-10-CM

## 2025-02-17 RX ORDER — FLUCONAZOLE 150 MG/1
150 TABLET ORAL ONCE
Qty: 1 TABLET | Refills: 2 | Status: SHIPPED | OUTPATIENT
Start: 2025-02-17 | End: 2025-02-17

## 2025-03-26 ENCOUNTER — TELEPHONE (OUTPATIENT)
Dept: PRIMARY CARE | Facility: CLINIC | Age: 50
End: 2025-03-26
Payer: COMMERCIAL

## 2025-03-26 DIAGNOSIS — E78.5 HYPERLIPIDEMIA, UNSPECIFIED HYPERLIPIDEMIA TYPE: Primary | ICD-10-CM

## 2025-03-26 DIAGNOSIS — R53.83 OTHER FATIGUE: ICD-10-CM

## 2025-03-26 DIAGNOSIS — Z12.5 SCREENING PSA (PROSTATE SPECIFIC ANTIGEN): ICD-10-CM

## 2025-03-26 NOTE — TELEPHONE ENCOUNTER
Patient's son, Brandon saw you for an appointment today and at check out, Lizett scheduled himself a physical with you on 4/2/25. He wanted to get blood work ahead of time. Wanted to know if you could order the labs he will need so he can  the orders so he can pick them up and take them to the  facility before his appointment    Ok for labs?  Please order the labs he will need    Thanks    Patient's # [phone]

## 2025-03-26 NOTE — TELEPHONE ENCOUNTER
Hans Dumont MD  Do [id number] minutes ago (2:17 PM)       Okay for fasting labs to be done 2 to 7 days before his appointment with me.  Please let him know.  He may need to  these lab orders.  Thanks

## 2025-03-27 ASSESSMENT — PROMIS GLOBAL HEALTH SCALE
RATE_SOCIAL_SATISFACTION: EXCELLENT
RATE_GENERAL_HEALTH: EXCELLENT
RATE_MENTAL_HEALTH: EXCELLENT
RATE_AVERAGE_PAIN: 0
RATE_QUALITY_OF_LIFE: EXCELLENT
RATE_QUALITY_OF_LIFE: EXCELLENT
RATE_GENERAL_HEALTH: EXCELLENT
RATE_PHYSICAL_HEALTH: EXCELLENT
CARRYOUT_SOCIAL_ACTIVITIES: EXCELLENT
CARRYOUT_SOCIAL_ACTIVITIES: EXCELLENT
RATE_AVERAGE_PAIN: 0
EMOTIONAL_PROBLEMS: NEVER
RATE_MENTAL_HEALTH: EXCELLENT
RATE_SOCIAL_SATISFACTION: EXCELLENT
EMOTIONAL_PROBLEMS: NEVER
CARRYOUT_PHYSICAL_ACTIVITIES: COMPLETELY
RATE_PHYSICAL_HEALTH: EXCELLENT
CARRYOUT_PHYSICAL_ACTIVITIES: COMPLETELY

## 2025-03-27 NOTE — TELEPHONE ENCOUNTER
SPOKE WITH PATIENT - HE IS AWARE THAT THE LABWORK HAS BEEN ORDERED - HE WILL STOP BY IN THE OFFICE TO PICK THEM UP.

## 2025-03-31 ENCOUNTER — HOSPITAL ENCOUNTER (OUTPATIENT)
Dept: RADIOLOGY | Facility: CLINIC | Age: 50
Discharge: HOME | End: 2025-03-31
Payer: COMMERCIAL

## 2025-03-31 ENCOUNTER — OFFICE VISIT (OUTPATIENT)
Dept: ORTHOPEDIC SURGERY | Facility: CLINIC | Age: 50
End: 2025-03-31
Payer: COMMERCIAL

## 2025-03-31 DIAGNOSIS — M77.01 MEDIAL EPICONDYLITIS OF RIGHT ELBOW: Primary | ICD-10-CM

## 2025-03-31 DIAGNOSIS — M25.521 PAIN OF BOTH ELBOWS: ICD-10-CM

## 2025-03-31 DIAGNOSIS — M25.522 PAIN OF BOTH ELBOWS: ICD-10-CM

## 2025-03-31 PROCEDURE — 2500000004 HC RX 250 GENERAL PHARMACY W/ HCPCS (ALT 636 FOR OP/ED): Performed by: STUDENT IN AN ORGANIZED HEALTH CARE EDUCATION/TRAINING PROGRAM

## 2025-03-31 PROCEDURE — 73080 X-RAY EXAM OF ELBOW: CPT | Mod: 50

## 2025-03-31 PROCEDURE — 99214 OFFICE O/P EST MOD 30 MIN: CPT | Performed by: STUDENT IN AN ORGANIZED HEALTH CARE EDUCATION/TRAINING PROGRAM

## 2025-03-31 PROCEDURE — 20605 DRAIN/INJ JOINT/BURSA W/O US: CPT | Mod: RT | Performed by: STUDENT IN AN ORGANIZED HEALTH CARE EDUCATION/TRAINING PROGRAM

## 2025-03-31 PROCEDURE — 99214 OFFICE O/P EST MOD 30 MIN: CPT | Mod: 25 | Performed by: STUDENT IN AN ORGANIZED HEALTH CARE EDUCATION/TRAINING PROGRAM

## 2025-03-31 RX ORDER — TRIAMCINOLONE ACETONIDE 40 MG/ML
40 INJECTION, SUSPENSION INTRA-ARTICULAR; INTRAMUSCULAR
Status: COMPLETED | OUTPATIENT
Start: 2025-03-31 | End: 2025-03-31

## 2025-03-31 RX ORDER — LIDOCAINE HYDROCHLORIDE 10 MG/ML
2 INJECTION, SOLUTION INFILTRATION; PERINEURAL
Status: COMPLETED | OUTPATIENT
Start: 2025-03-31 | End: 2025-03-31

## 2025-03-31 RX ADMIN — LIDOCAINE HYDROCHLORIDE 2 ML: 10 INJECTION, SOLUTION INFILTRATION; PERINEURAL at 10:34

## 2025-03-31 RX ADMIN — TRIAMCINOLONE ACETONIDE 40 MG: 40 INJECTION, SUSPENSION INTRA-ARTICULAR; INTRAMUSCULAR at 10:34

## 2025-03-31 NOTE — PROGRESS NOTES
History of Present Illness:  The patient presents today complaining of bilateral elbow pain right greater than left.   The patient notes a constant low grade achy pain localizing to the medial aspect of the elbow.  With activity the pain intensifies and is sharp in nature.   It is worth with lifting and usage of the ipsilateral upper extremity.  It is better with rest.      Also notes he gets some sharp posterior elbow pain when resting his elbow even in the car.  Will get a sharp pain that does relieve with removal of inciting irritation.    Past Medical History:   Diagnosis Date    Disorder of the skin and subcutaneous tissue, unspecified 03/01/2021    Skin lesion    Encounter for follow-up examination after completed treatment for conditions other than malignant neoplasm 01/25/2021    S/P inguinal hernia repair, follow-up exam    Encounter for general adult medical examination without abnormal findings 04/26/2019    Well adult exam    Encounter for general adult medical examination without abnormal findings 01/25/2021    Annual physical exam    Feeling of incomplete bladder emptying 09/19/2019    Feeling of incomplete bladder emptying    Lower abdominal pain, unspecified 01/25/2021    Inguinal pain    Lumbar disc disease     Other acute postprocedural pain     Post-op pain    Other conditions influencing health status     No significant past medical history    Personal history of diseases of the skin and subcutaneous tissue 03/01/2021    History of sebaceous cyst    Personal history of other diseases of the digestive system     History of umbilical hernia    Personal history of other diseases of the digestive system     History of inguinal hernia    Personal history of other specified conditions 09/19/2019    History of dysuria    Personal history of other specified conditions 09/19/2019    History of nocturia     Past Surgical History:   Procedure Laterality Date    HERNIA REPAIR  04/26/2019    inguinal hernia  bilateral and umbilical       Current Outpatient Medications:     ciclopirox (Loprox) 0.77 % cream, Apply topically once daily. (Patient not taking: Reported on 10/23/2024), Disp: 15 g, Rfl: 0    gabapentin (Neurontin) 300 mg capsule, Take 1 capsule (300 mg) by mouth 3 times a day., Disp: 90 capsule, Rfl: 0    rosuvastatin (Crestor) 5 mg tablet, Take 1 tablet (5 mg) by mouth once daily., Disp: 30 tablet, Rfl: 3    Review of Systems   GENERAL: Negative for malaise, significant weight loss, fever  MUSCULOSKELETAL: see HPI  NEURO:  Negative    Physical Examination:  Bilateral Elbow:  Skin healthy and intact  No significant swelling or ecchymosis   Tolerates ROM with no gross deficits  Tenderness to palpation at and just distal to medial epicondyle  Pain with resisted wrist flexion and pronation   Normal neurovascular exam distally     Imaging:  No fractures or degenerative changes       M Inj/Asp: R elbow (Medial epicondyle) on 3/31/2025 10:34 AM  Indications: pain  Details: 25 G needle, medial approach  Medications: 2 mL lidocaine 10 mg/mL (1 %); 40 mg triamcinolone acetonide 40 mg/mL          Assessment:  Patient with bilateral elbow medial epicondylitis, mild olecranon bursitis    Plan:  We reviewed the disease process with the patient.   We discussed the importance of home exercise program or formal physical therapy to help with stretching and lower the risk of recurrence.  Activity modification was advised when reasonable.   NSAIDs and Corticosteroid injection were discussed including the risks and benefits.  Surgery remains an option for refractory cases but is often not necessary.    The patient elected for an injection to the right side.  If he does get significant relief about the right side he would likely return for injection with the left side    .  Will provide exercise program  Discussed nature of injection  Return to clinic if he fails to get relief or if he does have worsening pain about either

## 2025-04-02 ENCOUNTER — APPOINTMENT (OUTPATIENT)
Dept: PRIMARY CARE | Facility: CLINIC | Age: 50
End: 2025-04-02
Payer: COMMERCIAL

## 2025-04-05 LAB
ALBUMIN SERPL-MCNC: 4.7 G/DL (ref 3.6–5.1)
ALP SERPL-CCNC: 48 U/L (ref 35–144)
ALT SERPL-CCNC: 33 U/L (ref 9–46)
ANION GAP SERPL CALCULATED.4IONS-SCNC: 9 MMOL/L (CALC) (ref 7–17)
AST SERPL-CCNC: 18 U/L (ref 10–35)
BASOPHILS # BLD AUTO: 54 CELLS/UL (ref 0–200)
BASOPHILS NFR BLD AUTO: 0.5 %
BILIRUB SERPL-MCNC: 0.8 MG/DL (ref 0.2–1.2)
BUN SERPL-MCNC: 19 MG/DL (ref 7–25)
CALCIUM SERPL-MCNC: 9.6 MG/DL (ref 8.6–10.3)
CHLORIDE SERPL-SCNC: 103 MMOL/L (ref 98–110)
CHOLEST SERPL-MCNC: 275 MG/DL
CHOLEST/HDLC SERPL: 4.4 (CALC)
CO2 SERPL-SCNC: 27 MMOL/L (ref 20–32)
CREAT SERPL-MCNC: 1.17 MG/DL (ref 0.7–1.3)
EGFRCR SERPLBLD CKD-EPI 2021: 76 ML/MIN/1.73M2
EOSINOPHIL # BLD AUTO: 43 CELLS/UL (ref 15–500)
EOSINOPHIL NFR BLD AUTO: 0.4 %
ERYTHROCYTE [DISTWIDTH] IN BLOOD BY AUTOMATED COUNT: 12 % (ref 11–15)
GLUCOSE SERPL-MCNC: 92 MG/DL (ref 65–99)
HCT VFR BLD AUTO: 47.6 % (ref 38.5–50)
HDLC SERPL-MCNC: 62 MG/DL
HGB BLD-MCNC: 16.1 G/DL (ref 13.2–17.1)
LDLC SERPL CALC-MCNC: 190 MG/DL (CALC)
LYMPHOCYTES # BLD AUTO: 2761 CELLS/UL (ref 850–3900)
LYMPHOCYTES NFR BLD AUTO: 25.8 %
MCH RBC QN AUTO: 30.9 PG (ref 27–33)
MCHC RBC AUTO-ENTMCNC: 33.8 G/DL (ref 32–36)
MCV RBC AUTO: 91.4 FL (ref 80–100)
MONOCYTES # BLD AUTO: 963 CELLS/UL (ref 200–950)
MONOCYTES NFR BLD AUTO: 9 %
NEUTROPHILS # BLD AUTO: 6880 CELLS/UL (ref 1500–7800)
NEUTROPHILS NFR BLD AUTO: 64.3 %
NONHDLC SERPL-MCNC: 213 MG/DL (CALC)
PLATELET # BLD AUTO: 297 THOUSAND/UL (ref 140–400)
PMV BLD REES-ECKER: 10.4 FL (ref 7.5–12.5)
POTASSIUM SERPL-SCNC: 4.1 MMOL/L (ref 3.5–5.3)
PROT SERPL-MCNC: 6.9 G/DL (ref 6.1–8.1)
PSA SERPL-MCNC: 0.68 NG/ML
RBC # BLD AUTO: 5.21 MILLION/UL (ref 4.2–5.8)
SODIUM SERPL-SCNC: 139 MMOL/L (ref 135–146)
TRIGL SERPL-MCNC: 108 MG/DL
WBC # BLD AUTO: 10.7 THOUSAND/UL (ref 3.8–10.8)

## 2025-04-07 ENCOUNTER — TELEPHONE (OUTPATIENT)
Dept: PRIMARY CARE | Facility: CLINIC | Age: 50
End: 2025-04-07
Payer: COMMERCIAL

## 2025-04-07 NOTE — TELEPHONE ENCOUNTER
Hans Dumont MD  [id number] [place] [id number] Clinical Support Staff  Labs are within normal limits or stable except your cholesterol is significantly elevated.  Your LDL bad cholesterol is much higher than last time.  It went up to 190 from 162.  Guidelines would recommend starting low-dose statin to bring these numbers down to minimize risk of future heart disease or stroke.  If she agrees would recommend starting Crestor 5 mg daily 30 pills with 5 refills.  Repeat lipid profile in 1 month after starting medication.  Please let her know and arrange if she agrees

## 2025-04-09 ENCOUNTER — APPOINTMENT (OUTPATIENT)
Dept: PRIMARY CARE | Facility: CLINIC | Age: 50
End: 2025-04-09
Payer: COMMERCIAL

## 2025-04-09 VITALS
HEIGHT: 71 IN | HEART RATE: 88 BPM | TEMPERATURE: 98.6 F | BODY MASS INDEX: 25.98 KG/M2 | SYSTOLIC BLOOD PRESSURE: 122 MMHG | OXYGEN SATURATION: 99 % | WEIGHT: 185.6 LBS | DIASTOLIC BLOOD PRESSURE: 80 MMHG | RESPIRATION RATE: 18 BRPM

## 2025-04-09 DIAGNOSIS — R35.0 BENIGN PROSTATIC HYPERPLASIA WITH URINARY FREQUENCY: ICD-10-CM

## 2025-04-09 DIAGNOSIS — E78.5 HYPERLIPIDEMIA, UNSPECIFIED HYPERLIPIDEMIA TYPE: ICD-10-CM

## 2025-04-09 DIAGNOSIS — Z00.00 ANNUAL PHYSICAL EXAM: Primary | ICD-10-CM

## 2025-04-09 DIAGNOSIS — N40.1 BENIGN PROSTATIC HYPERPLASIA WITH URINARY FREQUENCY: ICD-10-CM

## 2025-04-09 LAB
POC APPEARANCE, URINE: CLEAR
POC BILIRUBIN, URINE: NEGATIVE
POC BLOOD, URINE: NEGATIVE
POC COLOR, URINE: YELLOW
POC GLUCOSE, URINE: NEGATIVE MG/DL
POC KETONES, URINE: NEGATIVE MG/DL
POC LEUKOCYTES, URINE: NEGATIVE
POC NITRITE,URINE: NEGATIVE
POC PH, URINE: 5.5 PH
POC PROTEIN, URINE: NEGATIVE MG/DL
POC SPECIFIC GRAVITY, URINE: 1.01
POC UROBILINOGEN, URINE: 0.2 EU/DL

## 2025-04-09 PROCEDURE — 1036F TOBACCO NON-USER: CPT | Performed by: FAMILY MEDICINE

## 2025-04-09 PROCEDURE — 81003 URINALYSIS AUTO W/O SCOPE: CPT | Performed by: FAMILY MEDICINE

## 2025-04-09 PROCEDURE — 3008F BODY MASS INDEX DOCD: CPT | Performed by: FAMILY MEDICINE

## 2025-04-09 PROCEDURE — 99396 PREV VISIT EST AGE 40-64: CPT | Performed by: FAMILY MEDICINE

## 2025-04-09 RX ORDER — ROSUVASTATIN CALCIUM 5 MG/1
5 TABLET, COATED ORAL DAILY
Qty: 30 TABLET | Refills: 5 | Status: SHIPPED | OUTPATIENT
Start: 2025-04-09 | End: 2025-10-06

## 2025-04-09 RX ORDER — TAMSULOSIN HYDROCHLORIDE 0.4 MG/1
0.4 CAPSULE ORAL NIGHTLY
Qty: 30 CAPSULE | Refills: 11 | Status: SHIPPED | OUTPATIENT
Start: 2025-04-09 | End: 2026-04-09

## 2025-04-09 ASSESSMENT — PATIENT HEALTH QUESTIONNAIRE - PHQ9
SUM OF ALL RESPONSES TO PHQ9 QUESTIONS 1 AND 2: 0
2. FEELING DOWN, DEPRESSED OR HOPELESS: NOT AT ALL
1. LITTLE INTEREST OR PLEASURE IN DOING THINGS: NOT AT ALL

## 2025-04-09 NOTE — PROGRESS NOTES
"Subjective   Patient ID: Lizett Laboyfitt is a 49 y.o. male who presents for Annual Exam.  HPI  Present today for annual exam. Does eat a generally healthy diet. Does exercise. Last eye exam was 6 months ago. Last dental exam was 3 months ago. Is not fasting for blood work.     Taking current medications which were reviewed.  Problem list discussed.    Overall doing well.  Eating okay.  Staying active.    Has no other new problem /question.     ROS  Constitutional- No activity change. No appetite change.  Eyes- Denies vision changes.  Respiratory- No shortness of breath.  Cardiovascular- No palpitations. No chest pain.  GI- No nausea or vomiting. No diarrhea or constipation. Denies abdominal pain.  Musculoskeletal- Denies joint swelling.  Extremities- No edema.  Neurological- Denies headaches. Denies dizziness.  Skin- No rashes.  Psychiatric/Behavioral- Denies significant anxiety, or depressed mood.     Objective     /80   Pulse 88   Temp 37 °C (98.6 °F) (Temporal)   Resp 18   Ht 1.803 m (5' 11\")   Wt 84.2 kg (185 lb 9.6 oz)   SpO2 99%   BMI 25.89 kg/m²     No Known Allergies    Constitutional-- Well-nourished.  No distress  Head- unremarkable.  Eyes- PERRL.  Conjunctiva normal.  Nose- Normal.  No rhinorrhea noted.  Throat- Oropharynx is clear and moist.  Neck- Supple with no thyromegaly.  No significant cervical adenopathy noted.  Pulmonary/Chest- Breath sounds normal with normal effort.  No wheezing.  Heart- Regular rate and rhythm.  No murmur.  Abdomen- Soft and non-tender.  No masses noted.  Musculoskeletal- Normal ROM.  No significant joint swelling  Extremities- No edema.   Neurological- Alert.  No noted deficits.  Skin- Warm.  No rashes.  Psychiatric/Behavioral- Mood and affect normal.  Behavior normal.     UA unremarkable    Assessment/Plan   1. Annual physical exam        2. Hyperlipidemia, unspecified hyperlipidemia type  rosuvastatin (Crestor) 5 mg tablet    Lipid Panel    Lipid Panel    "   3. Benign prostatic hyperplasia with urinary frequency  tamsulosin (Flomax) 0.4 mg 24 hr capsule    POCT UA Automated manually resulted             Long talk. Treatment options reviewed.    Reviewed most recent lab work with patient. Advised patient to remain up to date on routine maintenance and health screening.    Discussed urinary symptoms.  Symptoms consistent with BPH.  Take Flomax as discussed.  Should notice improvement within days to weeks.  If not he will let me know    High cholesterol discussed.  Treatment options reviewed.  Will start low-dose Crestor daily.  Repeat lipids in a month    Discussed importance of natural sources of nutrition.  Advised patient to consume vegetables, salads, fruits, nuts, and proteins such as fish and chicken.       Discussed the importance of routine stretching and exercise.     Complete blood work as discussed. Advised patient to remain properly hydrated.     Continue and take your medications as prescribed.    Health Maintenance issues discussed.    Importance of healthy diet and regular exercise regimen discussed.    We will contact you with any test results ordered. If you do not hear from us, please contact.    Follow-up as instructed or sooner if any problems or symptoms do not resolve as expected.      Scribe Attestation  By signing my name below, IJanell Scribe   attest that this documentation has been prepared under the direction and in the presence of Hans Dumont MD.

## 2025-06-04 DIAGNOSIS — B35.9 TINEA: ICD-10-CM

## 2025-06-05 DIAGNOSIS — B35.9 TINEA: ICD-10-CM

## 2025-06-05 RX ORDER — FLUCONAZOLE 150 MG/1
TABLET ORAL
Qty: 1 TABLET | Refills: 2 | Status: SHIPPED | OUTPATIENT
Start: 2025-06-05 | End: 2025-06-06

## 2025-06-06 RX ORDER — FLUCONAZOLE 150 MG/1
TABLET ORAL
Qty: 1 TABLET | Refills: 2 | Status: SHIPPED | OUTPATIENT
Start: 2025-06-06

## 2025-08-05 ENCOUNTER — APPOINTMENT (OUTPATIENT)
Dept: ORTHOPEDIC SURGERY | Facility: CLINIC | Age: 50
End: 2025-08-05
Payer: COMMERCIAL

## 2025-08-05 ENCOUNTER — HOSPITAL ENCOUNTER (OUTPATIENT)
Dept: RADIOLOGY | Facility: CLINIC | Age: 50
Discharge: HOME | End: 2025-08-05
Payer: COMMERCIAL

## 2025-08-05 DIAGNOSIS — M54.16 LUMBAR RADICULOPATHY: Primary | ICD-10-CM

## 2025-08-05 DIAGNOSIS — M54.16 LUMBAR RADICULOPATHY: ICD-10-CM

## 2025-08-05 PROCEDURE — 72100 X-RAY EXAM L-S SPINE 2/3 VWS: CPT | Performed by: RADIOLOGY

## 2025-08-05 PROCEDURE — 99213 OFFICE O/P EST LOW 20 MIN: CPT | Performed by: PHYSICIAN ASSISTANT

## 2025-08-05 PROCEDURE — 72100 X-RAY EXAM L-S SPINE 2/3 VWS: CPT

## 2025-08-05 PROCEDURE — 1036F TOBACCO NON-USER: CPT | Performed by: PHYSICIAN ASSISTANT

## 2025-08-05 RX ORDER — PREDNISONE 10 MG/1
TABLET ORAL
Qty: 30 TABLET | Refills: 0 | Status: SHIPPED | OUTPATIENT
Start: 2025-08-05

## 2025-08-05 NOTE — PROGRESS NOTES
Established patient to the practice had a right L5-S1 microdiscectomy done.  This was done on 11/6/2024 patient recent recently developed a little bit of right sided tightness in the right leg and pain especially if he straightens his right leg out.  He played a couple rounds of golf he does not know if this truly contributed to.  It similar symptoms when he had before surgery but not as bad.  He denies bowel or bladder complaints saddle anesthesia gait or balance changes denies any trauma accidents.  Denies any fevers chills nausea vomiting or night sweats.    Physical exam: Well-nourished, well kept.  No lymphangitis or lymphadenopathy in the examined extremities.  Good perfusion to the extremities ×4.  Radial and dorsalis pedis pulses 2+.  Capillary refill to all 4 digits brisk.  No distal edema x 4.  Patient ambulating with no major difficulty.  Mild decreased range of motion flexion-extension rotation lumbar spine.  Positive straight leg on the right.  Motor strength intact in the lower extremities 5 out of 5.  Patient neurologically intact.    X-ray: X-ray lumbar spine taken today AP lateral shows degenerative disc at L5-S1 I do not see much change compared to prior x-rays before surgery.    Assessment: This a patient with some recurrent radicular symptoms on the right.  I explained to him this may be a little scar tissue or little inflammation we could try some steroid see if we calm it down.    Plan: Organ to try some steroids for about a week to see if this calms it down if it does not he will leave me a message and if he still having symptoms I will get an MRI with and without contrast at [facility] for diagnostic purposes.

## (undated) DEVICE — DRAPE, SHEET, XL

## (undated) DEVICE — CATHETER, IV, ANGIOCATH, 14 G X 5.25 IN, FEP POLYMER

## (undated) DEVICE — BRUSH ENDO CLN L90.5IN SHTH DIA1.7MM BRIST DIA5-7MM 2-6MM

## (undated) DEVICE — MANIFOLD, 4 PORT NEPTUNE STANDARD

## (undated) DEVICE — CHLORAPREP 26ML ORANGE

## (undated) DEVICE — Device

## (undated) DEVICE — PAD, GROUNDING, ELECTROSURGICAL, W/9 FT CABLE, POLYHESIVE II, ADULT, LF

## (undated) DEVICE — GLOVE, SURGICAL, PROTEXIS PI BLUE W/NEUTHERA, 7.5, PF, LF

## (undated) DEVICE — PENROSE TUBING RADIOPAQUE: Brand: ARGYLE

## (undated) DEVICE — GOWN,AURORA,NONREINFORCED,LARGE: Brand: MEDLINE

## (undated) DEVICE — GLOVE SURG 7.5 LTX TRIFLEX WIDE FINGER PWDR

## (undated) DEVICE — FORCEPS BX L240CM JAW DIA2.8MM L CAP W/ NDL MIC MESH TOOTH

## (undated) DEVICE — PEN: MARKING STD 100/CS: Brand: MEDICAL ACTION INDUSTRIES

## (undated) DEVICE — SLEEVE CMPR SM STD CALF SCD ANEMB LF

## (undated) DEVICE — SUTURE VCRL + SZ 2-0 L36IN ABSRB UD L36MM CT-1 1/2 CIR VCP945H

## (undated) DEVICE — BUR, 3MM X 3.8MM, PRECISION, NEURO DRILL

## (undated) DEVICE — IRRIGATION SYSTEM, WOUND, SURGIPHOR, 450ML, STERILE

## (undated) DEVICE — SUTURE VCRL + SZ 3-0 L36IN ABSRB UD L36MM CT-1 1/2 CIR VCP944H

## (undated) DEVICE — TRAY, MINOR, SINGLE BASIN, STERILE

## (undated) DEVICE — STRIP, SKIN CLOSURE, STERI STRIP, REINFORCED, 0.5 X 4 IN

## (undated) DEVICE — GLOVE, SURGICAL, PROTEXIS PI , 9.0, PF, LF

## (undated) DEVICE — PENCIL ES L3M BTTN SWCH HOLSTER W/ BLDE ELECTRD EDGE

## (undated) DEVICE — DRESSING, ISLAND, TELFA, 4 X 5 IN

## (undated) DEVICE — SINGLE PORT MANIFOLD: Brand: NEPTUNE 2

## (undated) DEVICE — DEVICE SUT FOR TRCR SITE INCIS ENDO CLOSE

## (undated) DEVICE — SEALANT, HEMOSTATIC, FLOSEAL, 10 ML

## (undated) DEVICE — LABEL MED MINI W/ MARKER

## (undated) DEVICE — CAUTERY, PENCIL, PUSH BUTTON, SMOKE EVAC, 70MM

## (undated) DEVICE — GAUZE,SPONGE,4"X4",12PLY,STERILE,LF,2'S: Brand: MEDLINE

## (undated) DEVICE — SUTURE PROL SZ 0 L30IN NONABSORBABLE BLU MO-6 L26MM 1/2 CIR 8418H

## (undated) DEVICE — SUTURE, VICRYL, 0, 27 IN, UR-6, VIOLET

## (undated) DEVICE — GLOVE, SURGICAL, PROTEXIS PI , 7.5, PF, LF

## (undated) DEVICE — SPONGE, NEURO, 3/4 X 3/4IN, STERILE, LF

## (undated) DEVICE — DRESSING, NON-ADHERENT, TELFA, OUCHLESS, 3 X 8 IN, STERILE

## (undated) DEVICE — DRAPE, SHEET, THREE QUARTER, FAN FOLD, 57 X 77 IN

## (undated) DEVICE — TUBE, FRAZIER SUCTION, 12 FR.

## (undated) DEVICE — BUR, 3MM PRECISION MATCH HEAD, 16CM

## (undated) DEVICE — GLOVE, SURGICAL, PROTEXIS PI , 8.0, PF, LF

## (undated) DEVICE — TOWEL,OR,DSP,ST,BLUE,STD,4/PK,20PK/CS: Brand: MEDLINE

## (undated) DEVICE — INTENDED FOR TISSUE SEPARATION, AND OTHER PROCEDURES THAT REQUIRE A SHARP SURGICAL BLADE TO PUNCTURE OR CUT.: Brand: BARD-PARKER ® CARBON RIB-BACK BLADES

## (undated) DEVICE — DRAPE,LAP,CHOLE,W/TROUGHS,STERILE: Brand: MEDLINE

## (undated) DEVICE — SUTURE VCRL + SZ 4-0 L18IN ABSRB UD L19MM PS-2 3/8 CIR PRIM VCP496H

## (undated) DEVICE — ENDO CARRY-ON PROCEDURE KIT: Brand: ENDO CARRY-ON PROCEDURE KIT

## (undated) DEVICE — SUTURE, MONOCRYL, 3-0, PS-1 27IN, UNDYED

## (undated) DEVICE — SUTURE PROL SZ 0 L30IN NONABSORBABLE BLU L36MM CT-1 1/2 CIR 8424H

## (undated) DEVICE — DRAPE, INCISE, ANTIMICROBIAL, IOBAN 2, STERI DRAPE, 23 X 33 IN, DISPOSABLE, STERILE

## (undated) DEVICE — 3M™ STERI-STRIP™ REINFORCED ADHESIVE SKIN CLOSURES, R1541, 1/4 IN X 3 IN (6 MM X 75 MM), 3 STRIPS/ENVELOPE: Brand: 3M™ STERI-STRIP™

## (undated) DEVICE — DRESSING, GAUZE, SPONGE, 12 PLY, 4 X 4 IN, PLASTIC POUCH, STRL 10PK

## (undated) DEVICE — SUTURE PROL SZ 2-0 L36IN NONABSORBABLE BLU SH L26MM 1/2 CIR 8523H

## (undated) DEVICE — GOWN, SURGICAL, ROYAL SILK, XL, STERILE

## (undated) DEVICE — Device: Brand: ENDO SMARTCAP

## (undated) DEVICE — SPONGE,LAP,18"X18",DLX,XR,ST,5/PK,40/PK: Brand: MEDLINE

## (undated) DEVICE — SOLUTION, IRRIGATION, SODIUM CHLORIDE 0.9%, 1000 ML, POUR BOTTLE

## (undated) DEVICE — PKIT, PROAXIS PRONEVIEW, NO ACP TUBING

## (undated) DEVICE — PROTECTOR, NERVE, ULNAR, PINK

## (undated) DEVICE — TUBE SET 96 MM 64 MM H2O PERISTALTIC STD AUX CHANNEL

## (undated) DEVICE — X-RAY DETECTABLE SPONGES,16 PLY: Brand: VISTEC

## (undated) DEVICE — ELECTRODE, ELECTROSURGICAL, BLADE, INSULATED, ENT/IMA, STERILE

## (undated) DEVICE — DRAPE, SHEET, 17 X 23 IN

## (undated) DEVICE — MEDI-VAC NON-CONDUCTIVE SUCTION TUBING: Brand: CARDINAL HEALTH

## (undated) DEVICE — STERILE LATEX POWDER-FREE SURGICAL GLOVESWITH NITRILE COATING: Brand: PROTEXIS

## (undated) DEVICE — SKIN MARKER,REGULAR TIP WITH RULER: Brand: DEVON

## (undated) DEVICE — TUBING, SUCTION, 1/4" X 10', STRAIGHT: Brand: MEDLINE

## (undated) DEVICE — GLOVE, SURGICAL, PROTEXIS PI BLUE W/NEUTHERA, 8.0, PF, LF

## (undated) DEVICE — ADHESIVE, SKIN, MASTISOL, 2/3 CC VIAL

## (undated) DEVICE — PACK,SET UP,DRAPE: Brand: MEDLINE

## (undated) DEVICE — GOWN, SURGICAL, ROYAL SILK, LG, STERILE

## (undated) DEVICE — APPLICATOR, CHLORAPREP, W/ORANGE TINT, 26ML

## (undated) DEVICE — SUTURE VCRL SZ 2-0 L18IN ABSRB VLT POLYGLACTIN 910 BRAID J105T

## (undated) DEVICE — 1842 FOAM BLOCK NEEDLE COUNTER: Brand: DEVON

## (undated) DEVICE — ELECTRODE PT RET AD L9FT HI MOIST COND ADH HYDRGEL CORDED

## (undated) DEVICE — GOWN,AURORA,NONRNF,XL,30/CS: Brand: MEDLINE

## (undated) DEVICE — DRESSING, GAUZE, SPONGE, 8 PLY, CURITY, 4 X 4, LF

## (undated) DEVICE — TIP,  ELECTRODE COATED INSULATED, EXTENDED, LF

## (undated) DEVICE — SUTURE VCRL SZ 2-0 L54IN ABSRB VLT LIGAPAK REEL NDL J206G

## (undated) DEVICE — SUTURE VIC + BR VIO 3-0 18IN VCP104G

## (undated) DEVICE — SUTURE, VICRYL, 2-0, 18 IN CP-2, UNDYED

## (undated) DEVICE — GLOVE, SURGICAL, PROTEXIS PI , 7.0, PF, LF

## (undated) DEVICE — DRAPE, INSTRUMENT, W/POUCH, STERI DRAPE, 7 X 11 IN, DISPOSABLE, STERILE

## (undated) DEVICE — SPONGE, DISSECTOR, PEANUT, 3/8, STERILE 5 FOAM HOLDER"

## (undated) DEVICE — STAPLER SKIN H3.9MM WIRE DIA0.58MM CRWN 6.9MM 35 STPL FIX

## (undated) DEVICE — SECTO® DISSECTOR, KITTNER, 5/16 IN DIAMETER, (5 EA/POUCH, 24 POUCHES/PK, 4 PK/BX): Brand: SYMMETRY SURGICAL

## (undated) DEVICE — 2000CC GUARDIAN II: Brand: GUARDIAN